# Patient Record
Sex: MALE | Race: WHITE | Employment: OTHER | URBAN - METROPOLITAN AREA
[De-identification: names, ages, dates, MRNs, and addresses within clinical notes are randomized per-mention and may not be internally consistent; named-entity substitution may affect disease eponyms.]

---

## 2017-07-07 PROBLEM — I45.10 RBBB: Status: ACTIVE | Noted: 2017-07-07

## 2018-04-24 ENCOUNTER — OFFICE VISIT (OUTPATIENT)
Dept: CARDIOLOGY CLINIC | Age: 68
End: 2018-04-24
Payer: MEDICARE

## 2018-04-24 VITALS
BODY MASS INDEX: 22.08 KG/M2 | HEIGHT: 77 IN | HEART RATE: 62 BPM | SYSTOLIC BLOOD PRESSURE: 102 MMHG | DIASTOLIC BLOOD PRESSURE: 62 MMHG | WEIGHT: 187 LBS

## 2018-04-24 DIAGNOSIS — I34.0 MILD MITRAL REGURGITATION: ICD-10-CM

## 2018-04-24 DIAGNOSIS — I36.1 NON-RHEUMATIC TRICUSPID VALVE INSUFFICIENCY: ICD-10-CM

## 2018-04-24 DIAGNOSIS — I10 ESSENTIAL HYPERTENSION: Primary | ICD-10-CM

## 2018-04-24 DIAGNOSIS — I45.10 RBBB: ICD-10-CM

## 2018-04-24 PROCEDURE — 99214 OFFICE O/P EST MOD 30 MIN: CPT | Performed by: INTERNAL MEDICINE

## 2018-04-24 PROCEDURE — 3017F COLORECTAL CA SCREEN DOC REV: CPT | Performed by: INTERNAL MEDICINE

## 2018-04-24 PROCEDURE — 1123F ACP DISCUSS/DSCN MKR DOCD: CPT | Performed by: INTERNAL MEDICINE

## 2018-04-24 PROCEDURE — G8427 DOCREV CUR MEDS BY ELIG CLIN: HCPCS | Performed by: INTERNAL MEDICINE

## 2018-04-24 PROCEDURE — 93000 ELECTROCARDIOGRAM COMPLETE: CPT | Performed by: INTERNAL MEDICINE

## 2018-04-24 PROCEDURE — 4040F PNEUMOC VAC/ADMIN/RCVD: CPT | Performed by: INTERNAL MEDICINE

## 2018-04-24 PROCEDURE — 1036F TOBACCO NON-USER: CPT | Performed by: INTERNAL MEDICINE

## 2018-04-24 PROCEDURE — G8420 CALC BMI NORM PARAMETERS: HCPCS | Performed by: INTERNAL MEDICINE

## 2019-03-07 ENCOUNTER — OFFICE VISIT (OUTPATIENT)
Dept: CARDIOLOGY CLINIC | Age: 69
End: 2019-03-07
Payer: MEDICARE

## 2019-03-07 VITALS
WEIGHT: 194 LBS | BODY MASS INDEX: 22.91 KG/M2 | SYSTOLIC BLOOD PRESSURE: 98 MMHG | DIASTOLIC BLOOD PRESSURE: 60 MMHG | HEART RATE: 68 BPM | HEIGHT: 77 IN

## 2019-03-07 DIAGNOSIS — R07.9 CHEST PAIN, UNSPECIFIED TYPE: Primary | ICD-10-CM

## 2019-03-07 PROCEDURE — G8420 CALC BMI NORM PARAMETERS: HCPCS | Performed by: NURSE PRACTITIONER

## 2019-03-07 PROCEDURE — 1123F ACP DISCUSS/DSCN MKR DOCD: CPT | Performed by: NURSE PRACTITIONER

## 2019-03-07 PROCEDURE — G8427 DOCREV CUR MEDS BY ELIG CLIN: HCPCS | Performed by: NURSE PRACTITIONER

## 2019-03-07 PROCEDURE — 1036F TOBACCO NON-USER: CPT | Performed by: NURSE PRACTITIONER

## 2019-03-07 PROCEDURE — 93000 ELECTROCARDIOGRAM COMPLETE: CPT | Performed by: NURSE PRACTITIONER

## 2019-03-07 PROCEDURE — 1101F PT FALLS ASSESS-DOCD LE1/YR: CPT | Performed by: NURSE PRACTITIONER

## 2019-03-07 PROCEDURE — 99214 OFFICE O/P EST MOD 30 MIN: CPT | Performed by: NURSE PRACTITIONER

## 2019-03-07 PROCEDURE — 3017F COLORECTAL CA SCREEN DOC REV: CPT | Performed by: NURSE PRACTITIONER

## 2019-03-07 PROCEDURE — G8484 FLU IMMUNIZE NO ADMIN: HCPCS | Performed by: NURSE PRACTITIONER

## 2019-03-07 PROCEDURE — 4040F PNEUMOC VAC/ADMIN/RCVD: CPT | Performed by: NURSE PRACTITIONER

## 2019-03-07 RX ORDER — AMLODIPINE BESYLATE 10 MG/1
5 TABLET ORAL DAILY
COMMUNITY
Start: 2019-02-21

## 2019-03-07 RX ORDER — ATORVASTATIN CALCIUM 20 MG/1
20 TABLET, FILM COATED ORAL DAILY
COMMUNITY
Start: 2019-02-21

## 2019-03-18 DIAGNOSIS — R07.9 CHEST PAIN, UNSPECIFIED TYPE: Primary | ICD-10-CM

## 2019-03-22 ENCOUNTER — HOSPITAL ENCOUNTER (OUTPATIENT)
Dept: CARDIOLOGY | Age: 69
Discharge: HOME | End: 2019-03-22
Payer: MEDICARE

## 2019-03-22 VITALS
DIASTOLIC BLOOD PRESSURE: 70 MMHG | WEIGHT: 195 LBS | HEIGHT: 77 IN | HEART RATE: 85 BPM | OXYGEN SATURATION: 98 % | SYSTOLIC BLOOD PRESSURE: 130 MMHG | BODY MASS INDEX: 23.02 KG/M2

## 2019-03-22 DIAGNOSIS — R07.9 CHEST PAIN, UNSPECIFIED TYPE: ICD-10-CM

## 2019-03-22 PROCEDURE — 78452 HT MUSCLE IMAGE SPECT MULT: CPT

## 2019-03-22 PROCEDURE — 93017 CV STRESS TEST TRACING ONLY: CPT

## 2019-03-22 PROCEDURE — A9502 TC99M TETROFOSMIN: HCPCS | Performed by: INTERNAL MEDICINE

## 2019-03-22 PROCEDURE — 93018 CV STRESS TEST I&R ONLY: CPT | Performed by: INTERNAL MEDICINE

## 2019-03-22 PROCEDURE — 3430000000 HC RX DIAGNOSTIC RADIOPHARMACEUTICAL: Performed by: INTERNAL MEDICINE

## 2019-03-22 PROCEDURE — 2580000003 HC RX 258: Performed by: INTERNAL MEDICINE

## 2019-03-22 PROCEDURE — 93016 CV STRESS TEST SUPVJ ONLY: CPT | Performed by: INTERNAL MEDICINE

## 2019-03-22 RX ORDER — SODIUM CHLORIDE 0.9 % (FLUSH) 0.9 %
10 SYRINGE (ML) INJECTION PRN
Status: DISCONTINUED | OUTPATIENT
Start: 2019-03-22 | End: 2019-03-23 | Stop reason: HOSPADM

## 2019-03-22 RX ADMIN — Medication 10 ML: at 10:30

## 2019-03-22 RX ADMIN — TETROFOSMIN 10 MILLICURIE: 0.23 INJECTION, POWDER, LYOPHILIZED, FOR SOLUTION INTRAVENOUS at 08:38

## 2019-03-22 RX ADMIN — Medication 10 ML: at 08:38

## 2019-03-22 RX ADMIN — TETROFOSMIN 33 MILLICURIE: 0.23 INJECTION, POWDER, LYOPHILIZED, FOR SOLUTION INTRAVENOUS at 10:30

## 2019-03-25 ENCOUNTER — TELEPHONE (OUTPATIENT)
Dept: CARDIOLOGY CLINIC | Age: 69
End: 2019-03-25

## 2019-07-09 ENCOUNTER — OFFICE VISIT (OUTPATIENT)
Dept: ONCOLOGY | Age: 69
End: 2019-07-09
Payer: MEDICARE

## 2019-07-09 ENCOUNTER — HOSPITAL ENCOUNTER (OUTPATIENT)
Dept: INFUSION THERAPY | Age: 69
Discharge: HOME | End: 2019-07-09
Payer: MEDICARE

## 2019-07-09 VITALS
HEIGHT: 77 IN | HEART RATE: 65 BPM | BODY MASS INDEX: 22.32 KG/M2 | WEIGHT: 189 LBS | DIASTOLIC BLOOD PRESSURE: 74 MMHG | TEMPERATURE: 98.7 F | SYSTOLIC BLOOD PRESSURE: 126 MMHG

## 2019-07-09 DIAGNOSIS — D47.2 MONOCLONAL GAMMOPATHY: Primary | ICD-10-CM

## 2019-07-09 DIAGNOSIS — D47.2 MONOCLONAL GAMMOPATHY: ICD-10-CM

## 2019-07-09 LAB
ALBUMIN SERPL-MCNC: 4.4 G/DL (ref 3.5–5.2)
ALP BLD-CCNC: 60 U/L (ref 40–129)
ALT SERPL-CCNC: 12 U/L (ref 0–40)
ANION GAP SERPL CALCULATED.3IONS-SCNC: 12 MMOL/L (ref 7–16)
AST SERPL-CCNC: 28 U/L (ref 0–39)
BASOPHILS ABSOLUTE: 0.04 E9/L (ref 0–0.2)
BASOPHILS RELATIVE PERCENT: 0.4 % (ref 0–2)
BILIRUB SERPL-MCNC: 0.5 MG/DL (ref 0–1.2)
BUN BLDV-MCNC: 17 MG/DL (ref 8–23)
CALCIUM SERPL-MCNC: 9.3 MG/DL (ref 8.6–10.2)
CHLORIDE BLD-SCNC: 106 MMOL/L (ref 98–107)
CO2: 25 MMOL/L (ref 22–29)
CREAT SERPL-MCNC: 0.8 MG/DL (ref 0.7–1.2)
EOSINOPHILS ABSOLUTE: 0.11 E9/L (ref 0.05–0.5)
EOSINOPHILS RELATIVE PERCENT: 1.2 % (ref 0–6)
GFR AFRICAN AMERICAN: >60
GFR NON-AFRICAN AMERICAN: >60 ML/MIN/1.73
GLUCOSE BLD-MCNC: 100 MG/DL (ref 74–99)
HCT VFR BLD CALC: 41.7 % (ref 37–54)
HEMOGLOBIN: 14.4 G/DL (ref 12.5–16.5)
IMMATURE GRANULOCYTES #: 0.02 E9/L
IMMATURE GRANULOCYTES %: 0.2 % (ref 0–5)
LACTATE DEHYDROGENASE: 159 U/L (ref 135–225)
LYMPHOCYTES ABSOLUTE: 2.34 E9/L (ref 1.5–4)
LYMPHOCYTES RELATIVE PERCENT: 24.6 % (ref 20–42)
MCH RBC QN AUTO: 31.2 PG (ref 26–35)
MCHC RBC AUTO-ENTMCNC: 34.5 % (ref 32–34.5)
MCV RBC AUTO: 90.3 FL (ref 80–99.9)
MONOCYTES ABSOLUTE: 0.85 E9/L (ref 0.1–0.95)
MONOCYTES RELATIVE PERCENT: 8.9 % (ref 2–12)
NEUTROPHILS ABSOLUTE: 6.14 E9/L (ref 1.8–7.3)
NEUTROPHILS RELATIVE PERCENT: 64.7 % (ref 43–80)
PDW BLD-RTO: 12.4 FL (ref 11.5–15)
PLATELET # BLD: 205 E9/L (ref 130–450)
PMV BLD AUTO: 9 FL (ref 7–12)
POTASSIUM SERPL-SCNC: 4.1 MMOL/L (ref 3.5–5)
RBC # BLD: 4.62 E12/L (ref 3.8–5.8)
SODIUM BLD-SCNC: 143 MMOL/L (ref 132–146)
WBC # BLD: 9.5 E9/L (ref 4.5–11.5)

## 2019-07-09 PROCEDURE — 84165 PROTEIN E-PHORESIS SERUM: CPT

## 2019-07-09 PROCEDURE — 80053 COMPREHEN METABOLIC PANEL: CPT

## 2019-07-09 PROCEDURE — 82232 ASSAY OF BETA-2 PROTEIN: CPT

## 2019-07-09 PROCEDURE — 83615 LACTATE (LD) (LDH) ENZYME: CPT

## 2019-07-09 PROCEDURE — 86334 IMMUNOFIX E-PHORESIS SERUM: CPT

## 2019-07-09 PROCEDURE — 83883 ASSAY NEPHELOMETRY NOT SPEC: CPT

## 2019-07-09 PROCEDURE — 99214 OFFICE O/P EST MOD 30 MIN: CPT

## 2019-07-09 PROCEDURE — 82784 ASSAY IGA/IGD/IGG/IGM EACH: CPT

## 2019-07-09 PROCEDURE — 85025 COMPLETE CBC W/AUTO DIFF WBC: CPT

## 2019-07-09 PROCEDURE — 36415 COLL VENOUS BLD VENIPUNCTURE: CPT

## 2019-07-09 RX ORDER — GLUCOSAMINE HCL 500 MG
TABLET ORAL
COMMUNITY

## 2019-07-09 NOTE — PROGRESS NOTES
Lynette Werner  1950 68 y.o. Referring Physician: Dr. Maral Martinez    PCP: Satnam Montiel DO    There were no vitals filed for this visit. Wt Readings from Last 3 Encounters:   19 189 lb (85.7 kg)   19 195 lb (88.5 kg)   19 194 lb (88 kg)        There is no height or weight on file to calculate BMI. Chief Complaint: \"cancer indicator in a blood test came back abnormal\"       Cancer Staging  No matching staging information was found for the patient. Prior Radiation Therapy? NO    Concurrent Chemo/radiation? NO    Prior Chemotherapy? NO    Prior Hormonal Therapy? NO    Head and Neck Cancer? No, patient does NOT have HN cancer. LMP:NA    Age at first Menses: NA    : NA    Para: NA          Current Outpatient Medications:     Cholecalciferol (VITAMIN D3) 3000 units TABS, Take by mouth, Disp: , Rfl:     atorvastatin (LIPITOR) 20 MG tablet, Take 20 mg by mouth daily , Disp: , Rfl:     amLODIPine (NORVASC) 10 MG tablet, Take 5 mg by mouth daily , Disp: , Rfl:     Benfotiamine 150 MG CAPS, Take by mouth, Disp: , Rfl:     doxazosin (CARDURA) 4 MG tablet, Take 4 mg by mouth nightly, Disp: , Rfl:     aspirin 81 MG tablet, Take 81 mg by mouth daily. , Disp: , Rfl:     lisinopril (PRINIVIL;ZESTRIL) 20 MG tablet, Take 20 mg by mouth daily. , Disp: , Rfl:        Past Medical History:   Diagnosis Date    Hypertension     x25 years    Mild mitral regurgitation     RBBB 2017       Past Surgical History:   Procedure Laterality Date    APPENDECTOMY         Family History   Problem Relation Age of Onset    Heart Attack Mother 71        heart attack    Stroke Father 71        heart attack       Social History     Socioeconomic History    Marital status: Single     Spouse name: Not on file    Number of children: Not on file    Years of education: Not on file    Highest education level: Not on file   Occupational History    Not on file   Social Needs    Financial resource strain: Not on file    Food insecurity:     Worry: Not on file     Inability: Not on file    Transportation needs:     Medical: Not on file     Non-medical: Not on file   Tobacco Use    Smoking status: Former Smoker    Smokeless tobacco: Never Used   Substance and Sexual Activity    Alcohol use: Yes     Comment: social.     Drug use: No    Sexual activity: Not on file   Lifestyle    Physical activity:     Days per week: Not on file     Minutes per session: Not on file    Stress: Not on file   Relationships    Social connections:     Talks on phone: Not on file     Gets together: Not on file     Attends Alevism service: Not on file     Active member of club or organization: Not on file     Attends meetings of clubs or organizations: Not on file     Relationship status: Not on file    Intimate partner violence:     Fear of current or ex partner: Not on file     Emotionally abused: Not on file     Physically abused: Not on file     Forced sexual activity: Not on file   Other Topics Concern    Not on file   Social History Narrative    Not on file           Occupation: retired  Retired:  YES: Patient is retired from [employer] in [address] [place] [address] . REVIEW OF SYSTEMS:   Pacemaker/Defibulator/ICD:  No    Mediport: No           FALLS RISK SCREENING ASSESSMENT    Instructions:  Assess the patient and [employer] the appropriate indicators that are present for fall risk identification. Total the numbers circled and assign a fall risk score from Table 2.  Reassess patient at a minimum every 12 weeks or with status change. Assessment   Date  7/9/2019     1. Mental Ability: confusion/cognitively impaired no       2. Elimination Issues: incontinence, frequency No - 0       3. Ambulatory: use of assistive devices (walker, cane, off-loading devices), attached to equipment (IV pole, oxygen) No - 0     4. Sensory Limitations: dizziness, vertigo, impaired vision no       5.   Age 72 years or greater - 1       6.  Medication: diuretics, strong analgesics, hypnotics, sedatives, antihypertensive agents   Yes - 3   7. Falls:  recent history of falls within the last 3 months (not to include slipping or tripping)   No - 0   TOTAL 4    If score of 4 or greater was education given? Yes       TABLE 2   Risk Score Risk Level Plan of Care   0-3 Little or  No Risk 1. Provide assistance as indicated for ambulation activities  2. Reorient confused/cognitively impaired patient  3. Call-light/bell within patient's reach  4. Chair/bed in low position, stretcher/bed with siderails up except when performing patient care activities  5. Educate patient/family/caregiver on falls prevention  6.  Reassess in 12 weeks or with any noted change in patient condition which places them at a risk for a fall   4-6 Moderate Risk 1. Provide assistance as indicated for ambulation activities  2. Reorient confused/cognitively impaired patient  3. Call-light/bell within patient's reach  4. Chair/bed in low position, stretcher/bed with siderails up except when performing patient care activities  5. Educate patient/family/caregiver on falls prevention  6. Falls risk precaution (Yellow sticker Level II) placed on patient chart   7 or   Higher High Risk 1. Place patient in easily observable treatment room  2. Patient attended at all times by family member or staff  3. Provide assistance as indicated for ambulation activities  4. Reorient confused/cognitively impaired patient  5. Call-light/bell within patient's reach  6. Chair/bed in low position, stretcher/bed with siderails up except when performing patient care activities  7. Educate patient/family/caregiver on falls prevention  8. Falls risk precaution (Yellow sticker Level III) placed on patient chart           MALNUTRITION RISK SCREENING ASSESSMENT    Instructions:  Assess the patient and enter the appropriate indicators that are present for nutrition risk identification.  Total the numbers entered and assign a risk score. Follow the appropriate action for total score listed below. Assessment   Date  7/9/2019     1. Have you lost weight without trying? 0- No     2. Have you been eating poorly because of a decreased appetite? 0- No   3. Do you have a diagnosis of head and neck cancer?       0- No                                                                                    TOTAL 0        Score of 0-1: No action  Score 2 or greater:  · For Non-Diabetic Patient: Recommend adding Ensure Complete 2 x daily and provide patient with Ensure wellness bag with coupons  · For Diabetic Patient: Recommend adding Glucerna Shake 2 x daily and provide patient with Glucerna Wellness bag with coupons  · Route to the dietitian via [employer]

## 2019-07-09 NOTE — PROGRESS NOTES
Start Date End Date Taking? Authorizing Provider   Cholecalciferol (VITAMIN D3) 3000 units TABS Take by mouth   Yes Historical Provider, MD   atorvastatin (LIPITOR) 20 MG tablet Take 20 mg by mouth daily  2/21/19  Yes Historical Provider, MD   amLODIPine (NORVASC) 10 MG tablet Take 5 mg by mouth daily  2/21/19  Yes Historical Provider, MD   Benfotiamine 150 MG CAPS Take by mouth   Yes Historical Provider, MD   doxazosin (CARDURA) 4 MG tablet Take 4 mg by mouth nightly   Yes Historical Provider, MD   lisinopril (PRINIVIL;ZESTRIL) 20 MG tablet Take 20 mg by mouth daily. Yes Historical Provider, MD   aspirin 81 MG tablet Take 81 mg by mouth daily. Historical Provider, MD       Allergies  No Known Allergies    Review of Systems:    Constitutional:  No fever chills or rigors. Eyes: No changes in vision, discharge, or pain  ENT: No Headaches, hearing loss or vertigo. No mouth sores or sore throat. No change in taste or smell. Cardiovascular: No chest discomfort, dyspnea on exertion, palpitations or loss of consciousness. or phlebitis. Respiratory: Has no cough or wheezing, Has no sputum production. Has no hemoptysis, Has no pleuritic pain, . Gastrointestinal: No abdominal pain, appetite loss, blood in stools. No change in bowel habits. No hematemesis   Genitourinary: Patient acknowledges no dysuria, trouble voiding, or hematuria. No nocturia or increased frequency. Musculoskeletal: No gait disturbance, weakness or joint complaints. Integumentary: No rash or pruritis. Neurological: No headache, diplopia, change in muscle strength. + numbness or tingling ONLY w/ riding his snowmobile. No change in gait, balance, coordination, mood, affect, memory, mentation, behavior. Psychiatric: No anxiety, or depression. Endocrine: No temperature intolerance. No excessive thirst, fluid intake, or urination. No tremor. Hematologic/Lymphatic: No abnormal bruising or bleeding, blood clots or swollen lymph nodes. all orders for this visit:    Monoclonal gammopathy    67 yo male  Abnormal M-spike of 0.3  HTN    - Will pursue work up to r/o MM  - Repeat SPEP, add BENI and UPEP  - Repeat CBC/CMP  - Ig's  - K/L FLC and FLCR  - B-2 microglobulin  - LDH  - Depending on above results, may need a BMBx.  However there is a decent possibility this represents a MGUS  - RTC in 2 weeks to review results        Matheus Loyola MD   Electronically signed 7/9/2019 at 2:33 PM

## 2019-07-12 LAB
BETA-2 MICROGLOBULIN: 1.3 MG/L (ref 0.6–2.4)
KAPPA FREE LIGHT CHAINS QNT: 2.18 MG/DL (ref 0.33–1.94)
KAPPA/LAMBDA FREE LIGHT CHAIN RATIO: 1.69 (ref 0.26–1.65)
LAMBDA FREE LIGHT CHAINS QNT: 1.29 MG/DL (ref 0.57–2.63)

## 2019-07-17 LAB
ALBUMIN SERPL-MCNC: 3.8 G/DL (ref 3.5–4.7)
ALPHA-1-GLOBULIN: 0.3 G/DL (ref 0.2–0.4)
ALPHA-2-GLOBULIN: 0.8 G/FL (ref 0.5–1)
BETA GLOBULIN: 1.1 G/DL (ref 0.8–1.3)
ELECTROPHORESIS: NORMAL
GAMMA GLOBULIN: 1.2 G/DL (ref 0.7–1.6)
IGA: 391 MG/DL (ref 70–400)
IGG: 911 MG/DL (ref 700–1600)
IGM: 75 MG/DL (ref 40–230)
IMMUNOFIXATION RESULT, SERUM: NORMAL
TOTAL PROTEIN: 7.2 G/DL (ref 6.4–8.3)

## 2019-07-23 ENCOUNTER — HOSPITAL ENCOUNTER (OUTPATIENT)
Dept: INFUSION THERAPY | Age: 69
Discharge: HOME | End: 2019-07-23
Payer: MEDICARE

## 2019-07-23 ENCOUNTER — OFFICE VISIT (OUTPATIENT)
Dept: ONCOLOGY | Age: 69
End: 2019-07-23
Payer: MEDICARE

## 2019-07-23 VITALS
SYSTOLIC BLOOD PRESSURE: 117 MMHG | WEIGHT: 186 LBS | HEART RATE: 58 BPM | BODY MASS INDEX: 21.96 KG/M2 | DIASTOLIC BLOOD PRESSURE: 69 MMHG | TEMPERATURE: 98.1 F | OXYGEN SATURATION: 97 % | HEIGHT: 77 IN

## 2019-07-23 DIAGNOSIS — D47.2 MONOCLONAL GAMMOPATHY: Primary | ICD-10-CM

## 2019-07-23 PROCEDURE — 99212 OFFICE O/P EST SF 10 MIN: CPT

## 2019-07-23 NOTE — PROGRESS NOTES
Medications  Prior to Admission medications    Medication Sig Start Date End Date Taking? Authorizing Provider   Cholecalciferol (VITAMIN D3) 3000 units TABS Take by mouth   Yes Historical Provider, MD   atorvastatin (LIPITOR) 20 MG tablet Take 20 mg by mouth daily  2/21/19  Yes Historical Provider, MD   amLODIPine (NORVASC) 10 MG tablet Take 5 mg by mouth daily  2/21/19  Yes Historical Provider, MD   Benfotiamine 150 MG CAPS Take by mouth   Yes Historical Provider, MD   doxazosin (CARDURA) 4 MG tablet Take 4 mg by mouth nightly   Yes Historical Provider, MD   aspirin 81 MG tablet Take 81 mg by mouth daily. Yes Historical Provider, MD   lisinopril (PRINIVIL;ZESTRIL) 20 MG tablet Take 20 mg by mouth daily. Yes Historical Provider, MD       Allergies  No Known Allergies    Review of Systems:    Constitutional:  No fever chills or rigors. Eyes: No changes in vision, discharge, or pain  ENT: No Headaches, hearing loss or vertigo. No mouth sores or sore throat. No change in taste or smell. Cardiovascular: No chest discomfort, dyspnea on exertion, palpitations or loss of consciousness. or phlebitis. Respiratory: Has no cough or wheezing, Has no sputum production. Has no hemoptysis, Has no pleuritic pain, . Gastrointestinal: No abdominal pain, appetite loss, blood in stools. No change in bowel habits. No hematemesis   Genitourinary: Patient acknowledges no dysuria, trouble voiding, or hematuria. No nocturia or increased frequency. Musculoskeletal: No gait disturbance, weakness or joint complaints. Integumentary: No rash or pruritis. Neurological: No headache, diplopia, change in muscle strength. + numbness or tingling ONLY w/ riding his snowmobile. No change in gait, balance, coordination, mood, affect, memory, mentation, behavior. Psychiatric: No anxiety, or depression. Endocrine: No temperature intolerance. No excessive thirst, fluid intake, or urination. No tremor.    Hematologic/Lymphatic: No abnormal

## 2019-10-08 ENCOUNTER — OFFICE VISIT (OUTPATIENT)
Dept: CARDIOLOGY CLINIC | Age: 69
End: 2019-10-08
Payer: MEDICARE

## 2019-10-08 VITALS
BODY MASS INDEX: 22.43 KG/M2 | DIASTOLIC BLOOD PRESSURE: 70 MMHG | WEIGHT: 190 LBS | HEIGHT: 77 IN | SYSTOLIC BLOOD PRESSURE: 120 MMHG | HEART RATE: 65 BPM

## 2019-10-08 DIAGNOSIS — I45.10 RBBB (RIGHT BUNDLE BRANCH BLOCK): ICD-10-CM

## 2019-10-08 DIAGNOSIS — I34.0 MILD MITRAL REGURGITATION: Primary | ICD-10-CM

## 2019-10-08 DIAGNOSIS — I10 ESSENTIAL HYPERTENSION: ICD-10-CM

## 2019-10-08 PROCEDURE — 3017F COLORECTAL CA SCREEN DOC REV: CPT | Performed by: INTERNAL MEDICINE

## 2019-10-08 PROCEDURE — G8427 DOCREV CUR MEDS BY ELIG CLIN: HCPCS | Performed by: INTERNAL MEDICINE

## 2019-10-08 PROCEDURE — G8484 FLU IMMUNIZE NO ADMIN: HCPCS | Performed by: INTERNAL MEDICINE

## 2019-10-08 PROCEDURE — 1123F ACP DISCUSS/DSCN MKR DOCD: CPT | Performed by: INTERNAL MEDICINE

## 2019-10-08 PROCEDURE — 99213 OFFICE O/P EST LOW 20 MIN: CPT | Performed by: INTERNAL MEDICINE

## 2019-10-08 PROCEDURE — G8420 CALC BMI NORM PARAMETERS: HCPCS | Performed by: INTERNAL MEDICINE

## 2019-10-08 PROCEDURE — 4040F PNEUMOC VAC/ADMIN/RCVD: CPT | Performed by: INTERNAL MEDICINE

## 2019-10-08 PROCEDURE — 93000 ELECTROCARDIOGRAM COMPLETE: CPT | Performed by: INTERNAL MEDICINE

## 2019-10-08 PROCEDURE — 1036F TOBACCO NON-USER: CPT | Performed by: INTERNAL MEDICINE

## 2019-10-22 ENCOUNTER — HOSPITAL ENCOUNTER (OUTPATIENT)
Dept: INFUSION THERAPY | Age: 69
Discharge: HOME | End: 2019-10-22
Payer: MEDICARE

## 2019-10-22 ENCOUNTER — OFFICE VISIT (OUTPATIENT)
Dept: ONCOLOGY | Age: 69
End: 2019-10-22
Payer: MEDICARE

## 2019-10-22 VITALS
DIASTOLIC BLOOD PRESSURE: 71 MMHG | OXYGEN SATURATION: 97 % | HEART RATE: 64 BPM | SYSTOLIC BLOOD PRESSURE: 132 MMHG | BODY MASS INDEX: 22.35 KG/M2 | WEIGHT: 189.3 LBS | HEIGHT: 77 IN | TEMPERATURE: 97 F

## 2019-10-22 DIAGNOSIS — D47.2 MONOCLONAL GAMMOPATHY: ICD-10-CM

## 2019-10-22 DIAGNOSIS — D47.2 MONOCLONAL GAMMOPATHY: Primary | ICD-10-CM

## 2019-10-22 LAB
BASOPHILS ABSOLUTE: 0.03 E9/L (ref 0–0.2)
BASOPHILS RELATIVE PERCENT: 0.4 % (ref 0–2)
EOSINOPHILS ABSOLUTE: 0.14 E9/L (ref 0.05–0.5)
EOSINOPHILS RELATIVE PERCENT: 1.8 % (ref 0–6)
HCT VFR BLD CALC: 43.1 % (ref 37–54)
HEMOGLOBIN: 14.4 G/DL (ref 12.5–16.5)
IMMATURE GRANULOCYTES #: 0.02 E9/L
IMMATURE GRANULOCYTES %: 0.3 % (ref 0–5)
LYMPHOCYTES ABSOLUTE: 2.09 E9/L (ref 1.5–4)
LYMPHOCYTES RELATIVE PERCENT: 27 % (ref 20–42)
MCH RBC QN AUTO: 31 PG (ref 26–35)
MCHC RBC AUTO-ENTMCNC: 33.4 % (ref 32–34.5)
MCV RBC AUTO: 92.9 FL (ref 80–99.9)
MONOCYTES ABSOLUTE: 0.91 E9/L (ref 0.1–0.95)
MONOCYTES RELATIVE PERCENT: 11.8 % (ref 2–12)
NEUTROPHILS ABSOLUTE: 4.54 E9/L (ref 1.8–7.3)
NEUTROPHILS RELATIVE PERCENT: 58.7 % (ref 43–80)
PDW BLD-RTO: 12.1 FL (ref 11.5–15)
PLATELET # BLD: 233 E9/L (ref 130–450)
PMV BLD AUTO: 9.2 FL (ref 7–12)
RBC # BLD: 4.64 E12/L (ref 3.8–5.8)
WBC # BLD: 7.7 E9/L (ref 4.5–11.5)

## 2019-10-22 PROCEDURE — 83883 ASSAY NEPHELOMETRY NOT SPEC: CPT

## 2019-10-22 PROCEDURE — 99212 OFFICE O/P EST SF 10 MIN: CPT

## 2019-10-22 PROCEDURE — 84165 PROTEIN E-PHORESIS SERUM: CPT

## 2019-10-22 PROCEDURE — 82232 ASSAY OF BETA-2 PROTEIN: CPT

## 2019-10-22 PROCEDURE — 86334 IMMUNOFIX E-PHORESIS SERUM: CPT

## 2019-10-22 PROCEDURE — 36415 COLL VENOUS BLD VENIPUNCTURE: CPT

## 2019-10-22 PROCEDURE — 82784 ASSAY IGA/IGD/IGG/IGM EACH: CPT

## 2019-10-22 PROCEDURE — 85025 COMPLETE CBC W/AUTO DIFF WBC: CPT

## 2019-10-23 LAB
ALBUMIN SERPL-MCNC: 3.7 G/DL (ref 3.5–4.7)
ALPHA-1-GLOBULIN: 0.2 G/DL (ref 0.2–0.4)
ALPHA-2-GLOBULIN: 0.7 G/FL (ref 0.5–1)
BETA GLOBULIN: 1.1 G/DL (ref 0.8–1.3)
ELECTROPHORESIS: NORMAL
GAMMA GLOBULIN: 1.3 G/DL (ref 0.7–1.6)
IGA: 407 MG/DL (ref 70–400)
IGG: 904 MG/DL (ref 700–1600)
IGM: 84 MG/DL (ref 40–230)
IMMUNOFIXATION RESULT, SERUM: NORMAL
TOTAL PROTEIN: 7 G/DL (ref 6.4–8.3)

## 2019-10-24 LAB
BETA-2 MICROGLOBULIN: 1.6 MG/L (ref 0.6–2.4)
KAPPA FREE LIGHT CHAINS QNT: 2.36 MG/DL (ref 0.33–1.94)
KAPPA/LAMBDA FREE LIGHT CHAIN RATIO: 1.62 (ref 0.26–1.65)
LAMBDA FREE LIGHT CHAINS QNT: 1.46 MG/DL (ref 0.57–2.63)

## 2020-02-25 ENCOUNTER — HOSPITAL ENCOUNTER (OUTPATIENT)
Dept: INFUSION THERAPY | Age: 70
Discharge: HOME | End: 2020-02-25
Payer: MEDICARE

## 2020-02-25 ENCOUNTER — OFFICE VISIT (OUTPATIENT)
Dept: ONCOLOGY | Age: 70
End: 2020-02-25
Payer: MEDICARE

## 2020-02-25 VITALS
SYSTOLIC BLOOD PRESSURE: 99 MMHG | TEMPERATURE: 96.5 F | DIASTOLIC BLOOD PRESSURE: 63 MMHG | OXYGEN SATURATION: 97 % | WEIGHT: 188.2 LBS | HEIGHT: 77 IN | HEART RATE: 66 BPM | BODY MASS INDEX: 22.22 KG/M2

## 2020-02-25 DIAGNOSIS — D47.2 MONOCLONAL GAMMOPATHY: ICD-10-CM

## 2020-02-25 LAB
ALBUMIN SERPL-MCNC: 4.3 G/DL (ref 3.5–5.2)
ALP BLD-CCNC: 54 U/L (ref 40–129)
ALT SERPL-CCNC: 10 U/L (ref 0–40)
ANION GAP SERPL CALCULATED.3IONS-SCNC: 12 MMOL/L (ref 7–16)
AST SERPL-CCNC: 23 U/L (ref 0–39)
BASOPHILS ABSOLUTE: 0.05 E9/L (ref 0–0.2)
BASOPHILS RELATIVE PERCENT: 0.8 % (ref 0–2)
BILIRUB SERPL-MCNC: 0.5 MG/DL (ref 0–1.2)
BUN BLDV-MCNC: 21 MG/DL (ref 8–23)
CALCIUM SERPL-MCNC: 9.6 MG/DL (ref 8.6–10.2)
CHLORIDE BLD-SCNC: 102 MMOL/L (ref 98–107)
CO2: 25 MMOL/L (ref 22–29)
CREAT SERPL-MCNC: 0.7 MG/DL (ref 0.7–1.2)
EOSINOPHILS ABSOLUTE: 0.11 E9/L (ref 0.05–0.5)
EOSINOPHILS RELATIVE PERCENT: 1.8 % (ref 0–6)
GFR AFRICAN AMERICAN: >60
GFR NON-AFRICAN AMERICAN: >60 ML/MIN/1.73
GLUCOSE BLD-MCNC: 105 MG/DL (ref 74–99)
HCT VFR BLD CALC: 44 % (ref 37–54)
HEMOGLOBIN: 15 G/DL (ref 12.5–16.5)
IMMATURE GRANULOCYTES #: 0.02 E9/L
IMMATURE GRANULOCYTES %: 0.3 % (ref 0–5)
LYMPHOCYTES ABSOLUTE: 1.86 E9/L (ref 1.5–4)
LYMPHOCYTES RELATIVE PERCENT: 30.5 % (ref 20–42)
MCH RBC QN AUTO: 31 PG (ref 26–35)
MCHC RBC AUTO-ENTMCNC: 34.1 % (ref 32–34.5)
MCV RBC AUTO: 90.9 FL (ref 80–99.9)
MONOCYTES ABSOLUTE: 0.66 E9/L (ref 0.1–0.95)
MONOCYTES RELATIVE PERCENT: 10.8 % (ref 2–12)
NEUTROPHILS ABSOLUTE: 3.39 E9/L (ref 1.8–7.3)
NEUTROPHILS RELATIVE PERCENT: 55.8 % (ref 43–80)
PDW BLD-RTO: 12 FL (ref 11.5–15)
PLATELET # BLD: 235 E9/L (ref 130–450)
PMV BLD AUTO: 9.7 FL (ref 7–12)
POTASSIUM SERPL-SCNC: 4.1 MMOL/L (ref 3.5–5)
RBC # BLD: 4.84 E12/L (ref 3.8–5.8)
SODIUM BLD-SCNC: 139 MMOL/L (ref 132–146)
WBC # BLD: 6.1 E9/L (ref 4.5–11.5)

## 2020-02-25 PROCEDURE — 99212 OFFICE O/P EST SF 10 MIN: CPT

## 2020-02-25 PROCEDURE — 80053 COMPREHEN METABOLIC PANEL: CPT

## 2020-02-25 PROCEDURE — 84165 PROTEIN E-PHORESIS SERUM: CPT

## 2020-02-25 PROCEDURE — 83883 ASSAY NEPHELOMETRY NOT SPEC: CPT

## 2020-02-25 PROCEDURE — 36415 COLL VENOUS BLD VENIPUNCTURE: CPT

## 2020-02-25 PROCEDURE — 82784 ASSAY IGA/IGD/IGG/IGM EACH: CPT

## 2020-02-25 PROCEDURE — 85025 COMPLETE CBC W/AUTO DIFF WBC: CPT

## 2020-02-25 NOTE — PROGRESS NOTES
[address]  [place] , [facility]   Hematology/Oncology  Consult      Patient Name: Farhad Balbuena  YOB: 1950  PCP: Devan Flores DO   Referring Provider: [address] [place] [address] / [facility] [id number]     Reason for Consultation:   Chief Complaint   Patient presents with    Follow-up     Monoclonal gammopathy        Subjective:  No new issues, no new complaints, BL katelynn numbness stable. Going for surgery with NSG at [facility] in April. Continues to feel very well    History of Present Illness: This pt is a very pleasant 69 yo male who presents today in referral for evaluation of an abnormal SPEP which showed a M-spike of 0.3 done by his PCP. Review of his CBC's over the last 10 years show a completely normal cell count. He states that this work up began after he noticed numbness, tingling and difficulty using his hands after he went snowmobiling some time ago. He states he gets this sensation every time he snowmobiles, but at no other time including when he is out in the cold or skiing, and not when he is holding other vibrating tools such as his lawnmower or weeding machine. He otherwise has no complaints today including no bone or back pain, no fevers, chills, sweats, or abnormal weight loss. He has not had any HA, dizziness, lightheadedness or other neuropathy.     Diagnostic Data:     Past Medical History:   Diagnosis Date    Hypertension     x25 years    Mild mitral regurgitation     RBBB 7/7/2017       Patient Active Problem List    Diagnosis Date Noted    RBBB 07/07/2017    Essential hypertension, benign 05/19/2015    Tricuspid regurgitation 05/19/2015    Mild mitral regurgitation         Past Surgical History:   Procedure Laterality Date    APPENDECTOMY         Family History  Family History   Problem Relation Age of Onset    Heart Attack Mother 71        heart attack    Stroke Father 71        heart attack       Social History    TOBACCO:   reports that he has quit smoking. He has never used smokeless tobacco.  ETOH:   reports current alcohol use. Home Medications  Prior to Admission medications    Medication Sig Start Date End Date Taking? Authorizing Provider   Cholecalciferol (VITAMIN D3) 3000 units TABS Take by mouth    Historical Provider, MD   atorvastatin (LIPITOR) 20 MG tablet Take 20 mg by mouth daily  2/21/19   Historical Provider, MD   amLODIPine (NORVASC) 10 MG tablet Take 5 mg by mouth daily  2/21/19   Historical Provider, MD   Benfotiamine 150 MG CAPS Take by mouth    Historical Provider, MD   doxazosin (CARDURA) 4 MG tablet Take 4 mg by mouth nightly    Historical Provider, MD   lisinopril (PRINIVIL;ZESTRIL) 20 MG tablet Take 20 mg by mouth daily. Historical Provider, MD       Allergies  No Known Allergies    Review of Systems:    Constitutional:  No fever chills or rigors. Eyes: No changes in vision, discharge, or pain  ENT: No Headaches, hearing loss or vertigo. No mouth sores or sore throat. No change in taste or smell. Cardiovascular: No chest discomfort, dyspnea on exertion, palpitations or loss of consciousness. or phlebitis. Respiratory: Has no cough or wheezing, Has no sputum production. Has no hemoptysis, Has no pleuritic pain, . Gastrointestinal: No abdominal pain, appetite loss, blood in stools. No change in bowel habits. No hematemesis   Genitourinary: Patient acknowledges no dysuria, trouble voiding, or hematuria. No nocturia or increased frequency. Musculoskeletal: No gait disturbance, weakness or joint complaints. Integumentary: No rash or pruritis. Neurological: No headache, diplopia, change in muscle strength. + numbness or tingling ONLY w/ riding his snowmobile. No change in gait, balance, coordination, mood, affect, memory, mentation, behavior. Psychiatric: No anxiety, or depression. Endocrine: No temperature intolerance. No excessive thirst, fluid intake, or urination. No tremor.    Hematologic/Lymphatic: No abnormal bruising found.      ASSESSMENT/PLAN :    Mary Guerrier was seen today for follow-up. Diagnoses and all orders for this visit:    Monoclonal gammopathy    69 yo male  Abnormal M-spike of 0.3  HTN    - Will pursue work up to r/o MM  - Repeat SPEP, add BENI and UPEP  - Repeat CBC/CMP  - Ig's  - K/L FLC and FLCR  - B-2 microglobulin  - LDH  - Depending on above results, may need a BMBx. However there is a decent possibility this represents a MGUS  - RTC in 2 weeks to review results    7/23/19  - Ig's normal  - Beta-2 1.3  - SPEP w/ faint restriction in gamma region and normal polyclonal IG's. Spoke with pathology and this was an unusual result, possibly an IgD or IgE. K/L FLC of 2.18/1.29, with FLCR of 1.69 (very slightly elevated)  - CBC and CMP WNL  - LDH normal  - Above abnormalities are slight, and given his otherwise lack of symptoms (except when on snowmobile), will elect to proceed with observation at this time  - RTC in 3 months w/ SPEP w/ BENI, Ig's, UPEP and BENI, and K/L FLC    10/22/19  - Labs today pending  - Saw neurology and diagnosed with a cervical radiculopathy (via EMG) which is believed to be causing him numbness. He will send me the reports as he forgot to bring them in today  - RTC in 4 months with repeat labs    2/25/20  - Labs from 10/19 stable  - Cervical radiculopathy causing BL hand numbness with snowmobile activities.  Going for surgery with NSG at [facility] in 4/20   - Cont to monitor MM panel, will move to q 6 months  - RTC in 6 months      Yenny Abdullahi MD   Electronically signed 2/25/2020 at 10:47 AM

## 2020-02-26 LAB
ALBUMIN SERPL-MCNC: 3.8 G/DL (ref 3.5–4.7)
ALPHA-1-GLOBULIN: 0.3 G/DL (ref 0.2–0.4)
ALPHA-2-GLOBULIN: 0.8 G/FL (ref 0.5–1)
BETA GLOBULIN: 0.9 G/DL (ref 0.8–1.3)
ELECTROPHORESIS: NORMAL
GAMMA GLOBULIN: 1.2 G/DL (ref 0.7–1.6)
IGA: 394 MG/DL (ref 70–400)
IGG: 889 MG/DL (ref 700–1600)
IGM: 77 MG/DL (ref 40–230)
KAPPA FREE LIGHT CHAINS QNT: 20.38 MG/L (ref 3.3–19.4)
KAPPA/LAMBDA FREE LIGHT CHAIN RATIO: 1.7 (ref 0.26–1.65)
LAMBDA FREE LIGHT CHAINS QNT: 12 MG/L (ref 5.71–26.3)
TOTAL PROTEIN: 6.9 G/DL (ref 6.4–8.3)

## 2020-08-18 ENCOUNTER — HOSPITAL ENCOUNTER (OUTPATIENT)
Dept: INFUSION THERAPY | Age: 70
Discharge: HOME | End: 2020-08-18
Payer: MEDICARE

## 2020-08-18 DIAGNOSIS — D47.2 MONOCLONAL GAMMOPATHY: ICD-10-CM

## 2020-08-18 LAB
ALBUMIN SERPL-MCNC: 4 G/DL (ref 3.5–5.2)
ALP BLD-CCNC: 67 U/L (ref 40–129)
ALT SERPL-CCNC: 12 U/L (ref 0–40)
ANION GAP SERPL CALCULATED.3IONS-SCNC: 11 MMOL/L (ref 7–16)
AST SERPL-CCNC: 25 U/L (ref 0–39)
BASOPHILS ABSOLUTE: 0.03 E9/L (ref 0–0.2)
BASOPHILS RELATIVE PERCENT: 0.4 % (ref 0–2)
BILIRUB SERPL-MCNC: 0.3 MG/DL (ref 0–1.2)
BUN BLDV-MCNC: 15 MG/DL (ref 8–23)
CALCIUM SERPL-MCNC: 9 MG/DL (ref 8.6–10.2)
CHLORIDE BLD-SCNC: 101 MMOL/L (ref 98–107)
CO2: 29 MMOL/L (ref 22–29)
CREAT SERPL-MCNC: 0.7 MG/DL (ref 0.7–1.2)
EOSINOPHILS ABSOLUTE: 0.1 E9/L (ref 0.05–0.5)
EOSINOPHILS RELATIVE PERCENT: 1.2 % (ref 0–6)
GFR AFRICAN AMERICAN: >60
GFR NON-AFRICAN AMERICAN: >60 ML/MIN/1.73
GLUCOSE BLD-MCNC: 81 MG/DL (ref 74–99)
HCT VFR BLD CALC: 42.6 % (ref 37–54)
HEMOGLOBIN: 14.4 G/DL (ref 12.5–16.5)
IMMATURE GRANULOCYTES #: 0.03 E9/L
IMMATURE GRANULOCYTES %: 0.4 % (ref 0–5)
LACTATE DEHYDROGENASE: 160 U/L (ref 135–225)
LYMPHOCYTES ABSOLUTE: 2.11 E9/L (ref 1.5–4)
LYMPHOCYTES RELATIVE PERCENT: 25.4 % (ref 20–42)
MCH RBC QN AUTO: 32 PG (ref 26–35)
MCHC RBC AUTO-ENTMCNC: 33.8 % (ref 32–34.5)
MCV RBC AUTO: 94.7 FL (ref 80–99.9)
MONOCYTES ABSOLUTE: 0.63 E9/L (ref 0.1–0.95)
MONOCYTES RELATIVE PERCENT: 7.6 % (ref 2–12)
NEUTROPHILS ABSOLUTE: 5.42 E9/L (ref 1.8–7.3)
NEUTROPHILS RELATIVE PERCENT: 65 % (ref 43–80)
PDW BLD-RTO: 12.5 FL (ref 11.5–15)
PLATELET # BLD: 253 E9/L (ref 130–450)
PMV BLD AUTO: 8.9 FL (ref 7–12)
POTASSIUM SERPL-SCNC: 4.2 MMOL/L (ref 3.5–5)
RBC # BLD: 4.5 E12/L (ref 3.8–5.8)
SODIUM BLD-SCNC: 141 MMOL/L (ref 132–146)
WBC # BLD: 8.3 E9/L (ref 4.5–11.5)

## 2020-08-18 PROCEDURE — 80053 COMPREHEN METABOLIC PANEL: CPT

## 2020-08-18 PROCEDURE — 83883 ASSAY NEPHELOMETRY NOT SPEC: CPT

## 2020-08-18 PROCEDURE — 82232 ASSAY OF BETA-2 PROTEIN: CPT

## 2020-08-18 PROCEDURE — 86334 IMMUNOFIX E-PHORESIS SERUM: CPT

## 2020-08-18 PROCEDURE — 36415 COLL VENOUS BLD VENIPUNCTURE: CPT

## 2020-08-18 PROCEDURE — 85025 COMPLETE CBC W/AUTO DIFF WBC: CPT

## 2020-08-18 PROCEDURE — 84165 PROTEIN E-PHORESIS SERUM: CPT

## 2020-08-18 PROCEDURE — 83615 LACTATE (LD) (LDH) ENZYME: CPT

## 2020-08-18 PROCEDURE — 82784 ASSAY IGA/IGD/IGG/IGM EACH: CPT

## 2020-08-19 LAB
ALBUMIN SERPL-MCNC: 3.6 G/DL (ref 3.5–4.7)
ALPHA-1-GLOBULIN: 0.3 G/DL (ref 0.2–0.4)
ALPHA-2-GLOBULIN: 0.8 G/FL (ref 0.5–1)
BETA GLOBULIN: 0.9 G/DL (ref 0.8–1.3)
ELECTROPHORESIS: NORMAL
GAMMA GLOBULIN: 1.1 G/DL (ref 0.7–1.6)
IGA: 442 MG/DL (ref 70–400)
IGG: 967 MG/DL (ref 700–1600)
IGM: 80 MG/DL (ref 40–230)
IMMUNOFIXATION RESULT, SERUM: NORMAL
TOTAL PROTEIN: 6.7 G/DL (ref 6.4–8.3)

## 2020-08-21 LAB
BETA-2 MICROGLOBULIN: 1.1 MG/L (ref 0.6–2.4)
KAPPA FREE LIGHT CHAINS QNT: 21.3 MG/L (ref 3.3–19.4)
KAPPA/LAMBDA FREE LIGHT CHAIN RATIO: 1.61 (ref 0.26–1.65)
LAMBDA FREE LIGHT CHAINS QNT: 13.21 MG/L (ref 5.71–26.3)

## 2020-08-25 ENCOUNTER — OFFICE VISIT (OUTPATIENT)
Dept: ONCOLOGY | Age: 70
End: 2020-08-25
Payer: MEDICARE

## 2020-08-25 VITALS
SYSTOLIC BLOOD PRESSURE: 125 MMHG | OXYGEN SATURATION: 96 % | HEART RATE: 60 BPM | DIASTOLIC BLOOD PRESSURE: 73 MMHG | WEIGHT: 175.1 LBS | TEMPERATURE: 97.7 F | BODY MASS INDEX: 20.76 KG/M2

## 2020-08-25 PROCEDURE — 99212 OFFICE O/P EST SF 10 MIN: CPT

## 2020-08-25 NOTE — PROGRESS NOTES
[address]  [address]   Hematology/Oncology  Consult      Patient Name: Winnie Somers  YOB: 1950  PCP: Rich Hawkins DO   Referring Provider: [address] [place] [id number]     Reason for Consultation:   Chief Complaint   Patient presents with    Follow-up        Subjective:  No new issues, no new complaints, BL hands numbness is improved s/p surgical intervention, which he tolerated well    History of Present Illness: This pt is a very pleasant 69 yo male who presents today in referral for evaluation of an abnormal SPEP which showed a M-spike of 0.3 done by his PCP. Review of his CBC's over the last 10 years show a completely normal cell count. He states that this work up began after he noticed numbness, tingling and difficulty using his hands after he went snowmobiling some time ago. He states he gets this sensation every time he snowmobiles, but at no other time including when he is out in the cold or skiing, and not when he is holding other vibrating tools such as his lawnmower or weeding machine. He otherwise has no complaints today including no bone or back pain, no fevers, chills, sweats, or abnormal weight loss. He has not had any HA, dizziness, lightheadedness or other neuropathy. Diagnostic Data:     Past Medical History:   Diagnosis Date    Hypertension     x25 years    Mild mitral regurgitation     RBBB 7/7/2017       Patient Active Problem List    Diagnosis Date Noted    RBBB 07/07/2017    Essential hypertension, benign 05/19/2015    Tricuspid regurgitation 05/19/2015    Mild mitral regurgitation         Past Surgical History:   Procedure Laterality Date    APPENDECTOMY         Family History  Family History   Problem Relation Age of Onset    Heart Attack Mother 71        heart attack    Stroke Father 71        heart attack       Social History    TOBACCO:   reports that he has quit smoking.  He has never used smokeless tobacco.  ETOH:   reports current alcohol use. Home Medications  Prior to Admission medications    Medication Sig Start Date End Date Taking? Authorizing Provider   Cholecalciferol (VITAMIN D3) 3000 units TABS Take by mouth   Yes Historical Provider, MD   atorvastatin (LIPITOR) 20 MG tablet Take 20 mg by mouth daily  2/21/19  Yes Historical Provider, MD   amLODIPine (NORVASC) 10 MG tablet Take 5 mg by mouth daily  2/21/19  Yes Historical Provider, MD   Benfotiamine 150 MG CAPS Take by mouth   Yes Historical Provider, MD   doxazosin (CARDURA) 4 MG tablet Take 4 mg by mouth nightly   Yes Historical Provider, MD   lisinopril (PRINIVIL;ZESTRIL) 20 MG tablet Take 20 mg by mouth daily. Yes Historical Provider, MD       Allergies  No Known Allergies    Review of Systems:    Constitutional:  No fever chills or rigors. Eyes: No changes in vision, discharge, or pain  ENT: No Headaches, hearing loss or vertigo. No mouth sores or sore throat. No change in taste or smell. Cardiovascular: No chest discomfort, dyspnea on exertion, palpitations or loss of consciousness. or phlebitis. Respiratory: Has no cough or wheezing, Has no sputum production. Has no hemoptysis, Has no pleuritic pain, . Gastrointestinal: No abdominal pain, appetite loss, blood in stools. No change in bowel habits. No hematemesis   Genitourinary: Patient acknowledges no dysuria, trouble voiding, or hematuria. No nocturia or increased frequency. Musculoskeletal: No gait disturbance, weakness or joint complaints. Integumentary: No rash or pruritis. Neurological: No headache, diplopia, change in muscle strength. + numbness or tingling ONLY w/ riding his snowmobile. No change in gait, balance, coordination, mood, affect, memory, mentation, behavior. Psychiatric: No anxiety, or depression. Endocrine: No temperature intolerance. No excessive thirst, fluid intake, or urination. No tremor.    Hematologic/Lymphatic: No abnormal bruising or bleeding, blood clots or swollen lymph nodes. Allergic/Immunologic: No nasal congestion or hives. Objective  /73 (Site: Left Upper Arm, Position: Sitting, Cuff Size: Medium Adult)   Pulse 60   Temp 97.7 °F (36.5 °C) (Temporal)   Wt 175 lb 1.6 oz (79.4 kg)   SpO2 96%   BMI 20.76 kg/m²     Physical Performance Status    Physical Exam:  General: AAO to person, place, time, and purpose, appears stated age, cooperative, no acute distress, pleasant   Head and neck : PERRLA, EOMI . Sclera non icteric. Oropharynx : Clear  Neck: no JVD,  no adenopathy  LYMPHATICS : No LAD  Lungs: Clear to auscultation   Heart: Regular rate and regular rhythm, no murmur, normal S1, S2  Abdomen: Soft, non-tender;no masses, no organomegaly  Extremities: No edema,no cyanosis, no clubbing  Skin:  No rash  Neurologic:Cranial nerves grossly intact. No focal motor or sensory deficits    Recent Laboratory Data-   Lab Results   Component Value Date    WBC 8.3 08/18/2020    HGB 14.4 08/18/2020    HCT 42.6 08/18/2020    MCV 94.7 08/18/2020     08/18/2020    LYMPHOPCT 25.4 08/18/2020    RBC 4.50 08/18/2020    MCH 32.0 08/18/2020    MCHC 33.8 08/18/2020    RDW 12.5 08/18/2020    NEUTOPHILPCT 65.0 08/18/2020    MONOPCT 7.6 08/18/2020    BASOPCT 0.4 08/18/2020    NEUTROABS 5.42 08/18/2020    LYMPHSABS 2.11 08/18/2020    MONOSABS 0.63 08/18/2020    EOSABS 0.10 08/18/2020    BASOSABS 0.03 08/18/2020       Lab Results   Component Value Date     08/18/2020    K 4.2 08/18/2020     08/18/2020    CO2 29 08/18/2020    BUN 15 08/18/2020    CREATININE 0.7 08/18/2020    GLUCOSE 81 08/18/2020    CALCIUM 9.0 08/18/2020    PROT 6.7 08/18/2020    LABALBU 3.6 08/18/2020    LABALBU 4.0 08/18/2020    BILITOT 0.3 08/18/2020    ALKPHOS 67 08/18/2020    AST 25 08/18/2020    ALT 12 08/18/2020    LABGLOM >60 08/18/2020    GFRAA >60 08/18/2020       No results found for: IRON, TIBC, [address] [facility]        Radiology-    No results found.       ASSESSMENT/PLAN Anup Navas was seen today for follow-up. Diagnoses and all orders for this visit:    Monoclonal gammopathy    67 yo male  Abnormal M-spike of 0.3  HTN    - Will pursue work up to r/o MM  - Repeat SPEP, add BENI and UPEP  - Repeat CBC/CMP  - Ig's  - K/L FLC and FLCR  - B-2 microglobulin  - LDH  - Depending on above results, may need a BMBx. However there is a decent possibility this represents a MGUS  - RTC in 2 weeks to review results    7/23/19  - Ig's normal  - Beta-2 1.3  - SPEP w/ faint restriction in gamma region and normal polyclonal IG's. Spoke with pathology and this was an unusual result, possibly an IgD or IgE. K/L FLC of 2.18/1.29, with FLCR of 1.69 (very slightly elevated)  - CBC and CMP WNL  - LDH normal  - Above abnormalities are slight, and given his otherwise lack of symptoms (except when on snowmobile), will elect to proceed with observation at this time  - RTC in 3 months w/ SPEP w/ BENI, Ig's, UPEP and BENI, and K/L FLC    10/22/19  - Labs today pending  - Saw neurology and diagnosed with a cervical radiculopathy (via EMG) which is believed to be causing him numbness. He will send me the reports as he forgot to bring them in today  - RTC in 4 months with repeat labs    2/25/20  - Labs from 10/19 stable  - Cervical radiculopathy causing BL hand numbness with snowmobile activities. Going for surgery with NSG at [facility] in 4/20   - Cont to monitor MM panel, will move to q 6 months  - RTC in 6 months    8/25/20  - Labs from last week with stable CBC/CMP  - IgA slightly elevated at 442, IgG/M normal  - Beta 2 was 1.1  - FLCR WNL at 1.61  - SPEP without M-spike  - Surgery went well at [facility], almost no down time after, improved strength in L hand.  Anxious to try snowmobiling again      Ashley Garsia MD   Electronically signed 8/25/2020 at 2:52 PM

## 2021-02-19 ENCOUNTER — HOSPITAL ENCOUNTER (OUTPATIENT)
Dept: INFUSION THERAPY | Age: 71
Discharge: HOME | End: 2021-02-19
Payer: MEDICARE

## 2021-02-19 DIAGNOSIS — D47.2 MONOCLONAL GAMMOPATHY: ICD-10-CM

## 2021-02-19 LAB
ALBUMIN SERPL-MCNC: 4.3 G/DL (ref 3.5–5.2)
ALP BLD-CCNC: 58 U/L (ref 40–129)
ALT SERPL-CCNC: 10 U/L (ref 0–40)
ANION GAP SERPL CALCULATED.3IONS-SCNC: 8 MMOL/L (ref 7–16)
AST SERPL-CCNC: 27 U/L (ref 0–39)
BASOPHILS ABSOLUTE: 0.04 E9/L (ref 0–0.2)
BASOPHILS RELATIVE PERCENT: 0.6 % (ref 0–2)
BILIRUB SERPL-MCNC: 0.4 MG/DL (ref 0–1.2)
BUN BLDV-MCNC: 18 MG/DL (ref 8–23)
CALCIUM SERPL-MCNC: 9.2 MG/DL (ref 8.6–10.2)
CHLORIDE BLD-SCNC: 103 MMOL/L (ref 98–107)
CO2: 29 MMOL/L (ref 22–29)
CREAT SERPL-MCNC: 0.7 MG/DL (ref 0.7–1.2)
EOSINOPHILS ABSOLUTE: 0.11 E9/L (ref 0.05–0.5)
EOSINOPHILS RELATIVE PERCENT: 1.5 % (ref 0–6)
GFR AFRICAN AMERICAN: >60
GFR NON-AFRICAN AMERICAN: >60 ML/MIN/1.73
GLUCOSE BLD-MCNC: 99 MG/DL (ref 74–99)
HCT VFR BLD CALC: 44 % (ref 37–54)
HEMOGLOBIN: 14.7 G/DL (ref 12.5–16.5)
IMMATURE GRANULOCYTES #: 0.02 E9/L
IMMATURE GRANULOCYTES %: 0.3 % (ref 0–5)
LACTATE DEHYDROGENASE: 155 U/L (ref 135–225)
LYMPHOCYTES ABSOLUTE: 2.21 E9/L (ref 1.5–4)
LYMPHOCYTES RELATIVE PERCENT: 30.8 % (ref 20–42)
MCH RBC QN AUTO: 31.6 PG (ref 26–35)
MCHC RBC AUTO-ENTMCNC: 33.4 % (ref 32–34.5)
MCV RBC AUTO: 94.6 FL (ref 80–99.9)
MONOCYTES ABSOLUTE: 0.68 E9/L (ref 0.1–0.95)
MONOCYTES RELATIVE PERCENT: 9.5 % (ref 2–12)
NEUTROPHILS ABSOLUTE: 4.12 E9/L (ref 1.8–7.3)
NEUTROPHILS RELATIVE PERCENT: 57.3 % (ref 43–80)
PDW BLD-RTO: 12.6 FL (ref 11.5–15)
PLATELET # BLD: 234 E9/L (ref 130–450)
PMV BLD AUTO: 9.3 FL (ref 7–12)
POTASSIUM SERPL-SCNC: 4.3 MMOL/L (ref 3.5–5)
RBC # BLD: 4.65 E12/L (ref 3.8–5.8)
SODIUM BLD-SCNC: 140 MMOL/L (ref 132–146)
TOTAL PROTEIN: 7.2 G/DL (ref 6.4–8.3)
WBC # BLD: 7.2 E9/L (ref 4.5–11.5)

## 2021-02-19 PROCEDURE — 82784 ASSAY IGA/IGD/IGG/IGM EACH: CPT

## 2021-02-19 PROCEDURE — 83883 ASSAY NEPHELOMETRY NOT SPEC: CPT

## 2021-02-19 PROCEDURE — 83615 LACTATE (LD) (LDH) ENZYME: CPT

## 2021-02-19 PROCEDURE — 36415 COLL VENOUS BLD VENIPUNCTURE: CPT

## 2021-02-19 PROCEDURE — 84165 PROTEIN E-PHORESIS SERUM: CPT

## 2021-02-19 PROCEDURE — 80053 COMPREHEN METABOLIC PANEL: CPT

## 2021-02-19 PROCEDURE — 82232 ASSAY OF BETA-2 PROTEIN: CPT

## 2021-02-19 PROCEDURE — 85025 COMPLETE CBC W/AUTO DIFF WBC: CPT

## 2021-02-19 PROCEDURE — 86334 IMMUNOFIX E-PHORESIS SERUM: CPT

## 2021-02-22 LAB
ALBUMIN SERPL-MCNC: 3.6 G/DL (ref 3.5–4.7)
ALPHA-1-GLOBULIN: 0.2 G/DL (ref 0.2–0.4)
ALPHA-2-GLOBULIN: 0.7 G/FL (ref 0.5–1)
BETA GLOBULIN: 1 G/DL (ref 0.8–1.3)
ELECTROPHORESIS: NORMAL
GAMMA GLOBULIN: 1.3 G/DL (ref 0.7–1.6)
IGA: 382 MG/DL (ref 70–400)
IGG: 942 MG/DL (ref 700–1600)
IGM: 81 MG/DL (ref 40–230)
IMMUNOFIXATION RESULT, SERUM: NORMAL
KAPPA FREE LIGHT CHAINS QNT: 21.27 MG/L (ref 3.3–19.4)
KAPPA/LAMBDA FREE LIGHT CHAIN RATIO: 1.42 (ref 0.26–1.65)
LAMBDA FREE LIGHT CHAINS QNT: 14.94 MG/L (ref 5.71–26.3)

## 2021-02-23 ENCOUNTER — OFFICE VISIT (OUTPATIENT)
Dept: ONCOLOGY | Age: 71
End: 2021-02-23
Payer: MEDICARE

## 2021-02-23 VITALS
WEIGHT: 198.6 LBS | BODY MASS INDEX: 23.45 KG/M2 | SYSTOLIC BLOOD PRESSURE: 121 MMHG | RESPIRATION RATE: 18 BRPM | TEMPERATURE: 97.2 F | OXYGEN SATURATION: 95 % | HEART RATE: 70 BPM | DIASTOLIC BLOOD PRESSURE: 62 MMHG | HEIGHT: 77 IN

## 2021-02-23 DIAGNOSIS — D47.2 MONOCLONAL GAMMOPATHY: Primary | ICD-10-CM

## 2021-02-23 PROCEDURE — 99212 OFFICE O/P EST SF 10 MIN: CPT

## 2021-02-23 NOTE — PROGRESS NOTES
[address]   Hematology/Oncology  Consult      Patient Name: Jocelyne Hawkins  YOB: 1950  PCP: Pardeep Pathak DO   Referring Provider: [address] [place] [address] Dr RT Butler / Emily Thibodeaux [id number]     Reason for Consultation:   No chief complaint on file. Subjective:  No new complaints, BL hands numbness remains improved s/p surgical intervention. Following with derm for SCC's. No new issues in interim    History of Present Illness: This pt is a very pleasant 69 yo male who presents today in referral for evaluation of an abnormal SPEP which showed a M-spike of 0.3 done by his PCP. Review of his CBC's over the last 10 years show a completely normal cell count. He states that this work up began after he noticed numbness, tingling and difficulty using his hands after he went snowmobiling some time ago. He states he gets this sensation every time he snowmobiles, but at no other time including when he is out in the cold or skiing, and not when he is holding other vibrating tools such as his lawnmower or weeding machine. He otherwise has no complaints today including no bone or back pain, no fevers, chills, sweats, or abnormal weight loss. He has not had any HA, dizziness, lightheadedness or other neuropathy. Diagnostic Data:     Past Medical History:   Diagnosis Date    Hypertension     x25 years    Mild mitral regurgitation     RBBB 7/7/2017       Patient Active Problem List    Diagnosis Date Noted    RBBB 07/07/2017    Essential hypertension, benign 05/19/2015    Tricuspid regurgitation 05/19/2015    Mild mitral regurgitation         Past Surgical History:   Procedure Laterality Date    APPENDECTOMY         Family History  Family History   Problem Relation Age of Onset    Heart Attack Mother 71        heart attack    Stroke Father 71        heart attack       Social History    TOBACCO:   reports that he has quit smoking.  He has never used smokeless clots or swollen lymph nodes. Allergic/Immunologic: No nasal congestion or hives. Objective  /62   Pulse 70   Temp 97.2 °F (36.2 °C)   Resp 18   Ht 6' 5\" (1.956 m)   Wt 198 lb 9.6 oz (90.1 kg)   SpO2 95%   BMI 23.55 kg/m²     Physical Performance Status    Physical Exam:  General: AAO to person, place, time, and purpose, appears stated age, cooperative, no acute distress, pleasant   Head and neck : PERRLA, EOMI . Sclera non icteric. Oropharynx : Clear  Neck: no JVD,  no adenopathy  LYMPHATICS : No LAD  Lungs: Clear to auscultation   Heart: Regular rate and regular rhythm, no murmur, normal S1, S2  Abdomen: Soft, non-tender;no masses, no organomegaly  Extremities: No edema,no cyanosis, no clubbing  Skin:  No rash  Neurologic:Cranial nerves grossly intact. No focal motor or sensory deficits    Recent Laboratory Data-   Lab Results   Component Value Date    WBC 7.2 02/19/2021    HGB 14.7 02/19/2021    HCT 44.0 02/19/2021    MCV 94.6 02/19/2021     02/19/2021    LYMPHOPCT 30.8 02/19/2021    RBC 4.65 02/19/2021    MCH 31.6 02/19/2021    MCHC 33.4 02/19/2021    RDW 12.6 02/19/2021    NEUTOPHILPCT 57.3 02/19/2021    MONOPCT 9.5 02/19/2021    BASOPCT 0.6 02/19/2021    NEUTROABS 4.12 02/19/2021    LYMPHSABS 2.21 02/19/2021    MONOSABS 0.68 02/19/2021    EOSABS 0.11 02/19/2021    BASOSABS 0.04 02/19/2021       Lab Results   Component Value Date     02/19/2021    K 4.3 02/19/2021     02/19/2021    CO2 29 02/19/2021    BUN 18 02/19/2021    CREATININE 0.7 02/19/2021    GLUCOSE 99 02/19/2021    CALCIUM 9.2 02/19/2021    PROT 7.2 02/19/2021    LABALBU 4.3 02/19/2021    LABALBU 3.6 02/19/2021    BILITOT 0.4 02/19/2021    ALKPHOS 58 02/19/2021    AST 27 02/19/2021    ALT 10 02/19/2021    LABGLOM >60 02/19/2021    GFRAA >60 02/19/2021       No results found for: IRON, TIBC, [address] [facility]        Radiology-    No results found.       ASSESSMENT/PLAN :    Diagnoses and all orders for this visit:    Monoclonal gammopathy    67 yo male  Abnormal M-spike of 0.3  HTN    - Will pursue work up to r/o MM  - Repeat SPEP, add BENI and UPEP  - Repeat CBC/CMP  - Ig's  - K/L FLC and FLCR  - B-2 microglobulin  - LDH  - Depending on above results, may need a BMBx. However there is a decent possibility this represents a MGUS  - RTC in 2 weeks to review results    7/23/19  - Ig's normal  - Beta-2 1.3  - SPEP w/ faint restriction in gamma region and normal polyclonal IG's. Spoke with pathology and this was an unusual result, possibly an IgD or IgE. K/L FLC of 2.18/1.29, with FLCR of 1.69 (very slightly elevated)  - CBC and CMP WNL  - LDH normal  - Above abnormalities are slight, and given his otherwise lack of symptoms (except when on snowmobile), will elect to proceed with observation at this time  - RTC in 3 months w/ SPEP w/ BENI, Ig's, UPEP and BENI, and K/L FLC    10/22/19  - Labs today pending  - Saw neurology and diagnosed with a cervical radiculopathy (via EMG) which is believed to be causing him numbness. He will send me the reports as he forgot to bring them in today  - RTC in 4 months with repeat labs    2/25/20  - Labs from 10/19 stable  - Cervical radiculopathy causing BL hand numbness with snowmobile activities. Going for surgery with NSG at [facility] in 4/20   - Cont to monitor MM panel, will move to q 6 months  - RTC in 6 months    8/25/20  - Labs from last week with stable CBC/CMP  - IgA slightly elevated at 442, IgG/M normal  - Beta 2 was 1.1  - FLCR WNL at 1.61  - SPEP without M-spike  - Surgery went well at [facility], almost no down time after, improved strength in L hand.  Anxious to try snowmobiling again    2/23/21  - CBC/CMP remain WNL  - SPEP w/o M-spike  - K/L FLCR WNL and stable  - Ig's WNL  - Continue observation alone at this time  - RTC in 6 months      Martha Lopez, MD   Electronically signed 2/23/2021 at 10:52 AM

## 2021-02-24 LAB — BETA-2 MICROGLOBULIN: 1.7 MG/L (ref 0.6–2.4)

## 2021-08-23 ENCOUNTER — HOSPITAL ENCOUNTER (OUTPATIENT)
Dept: INFUSION THERAPY | Age: 71
Discharge: HOME | End: 2021-08-23
Payer: MEDICARE

## 2021-08-23 DIAGNOSIS — D47.2 MONOCLONAL GAMMOPATHY: ICD-10-CM

## 2021-08-23 LAB
ALBUMIN SERPL-MCNC: 4.3 G/DL (ref 3.5–5.2)
ALP BLD-CCNC: 65 U/L (ref 40–129)
ALT SERPL-CCNC: 13 U/L (ref 0–40)
ANION GAP SERPL CALCULATED.3IONS-SCNC: 9 MMOL/L (ref 7–16)
AST SERPL-CCNC: 27 U/L (ref 0–39)
BASOPHILS ABSOLUTE: 0.03 E9/L (ref 0–0.2)
BASOPHILS RELATIVE PERCENT: 0.4 % (ref 0–2)
BILIRUB SERPL-MCNC: 0.4 MG/DL (ref 0–1.2)
BUN BLDV-MCNC: 14 MG/DL (ref 6–23)
CALCIUM SERPL-MCNC: 9.2 MG/DL (ref 8.6–10.2)
CHLORIDE BLD-SCNC: 103 MMOL/L (ref 98–107)
CO2: 27 MMOL/L (ref 22–29)
CREAT SERPL-MCNC: 0.7 MG/DL (ref 0.7–1.2)
EOSINOPHILS ABSOLUTE: 0.12 E9/L (ref 0.05–0.5)
EOSINOPHILS RELATIVE PERCENT: 1.6 % (ref 0–6)
GFR AFRICAN AMERICAN: >60
GFR NON-AFRICAN AMERICAN: >60 ML/MIN/1.73
GLUCOSE BLD-MCNC: 107 MG/DL (ref 74–99)
HCT VFR BLD CALC: 44.3 % (ref 37–54)
HEMOGLOBIN: 14.8 G/DL (ref 12.5–16.5)
IMMATURE GRANULOCYTES #: 0.01 E9/L
IMMATURE GRANULOCYTES %: 0.1 % (ref 0–5)
LYMPHOCYTES ABSOLUTE: 2.05 E9/L (ref 1.5–4)
LYMPHOCYTES RELATIVE PERCENT: 27.3 % (ref 20–42)
MCH RBC QN AUTO: 31.5 PG (ref 26–35)
MCHC RBC AUTO-ENTMCNC: 33.4 % (ref 32–34.5)
MCV RBC AUTO: 94.3 FL (ref 80–99.9)
MONOCYTES ABSOLUTE: 0.6 E9/L (ref 0.1–0.95)
MONOCYTES RELATIVE PERCENT: 8 % (ref 2–12)
NEUTROPHILS ABSOLUTE: 4.7 E9/L (ref 1.8–7.3)
NEUTROPHILS RELATIVE PERCENT: 62.6 % (ref 43–80)
PDW BLD-RTO: 12.6 FL (ref 11.5–15)
PLATELET # BLD: 235 E9/L (ref 130–450)
PMV BLD AUTO: 9.2 FL (ref 7–12)
POTASSIUM SERPL-SCNC: 4.5 MMOL/L (ref 3.5–5)
RBC # BLD: 4.7 E12/L (ref 3.8–5.8)
SODIUM BLD-SCNC: 139 MMOL/L (ref 132–146)
TOTAL PROTEIN: 7.3 G/DL (ref 6.4–8.3)
WBC # BLD: 7.5 E9/L (ref 4.5–11.5)

## 2021-08-23 PROCEDURE — 36415 COLL VENOUS BLD VENIPUNCTURE: CPT

## 2021-08-23 PROCEDURE — 85025 COMPLETE CBC W/AUTO DIFF WBC: CPT

## 2021-08-23 PROCEDURE — 82232 ASSAY OF BETA-2 PROTEIN: CPT

## 2021-08-23 PROCEDURE — 83883 ASSAY NEPHELOMETRY NOT SPEC: CPT

## 2021-08-23 PROCEDURE — 82784 ASSAY IGA/IGD/IGG/IGM EACH: CPT

## 2021-08-23 PROCEDURE — 80053 COMPREHEN METABOLIC PANEL: CPT

## 2021-08-23 PROCEDURE — 84165 PROTEIN E-PHORESIS SERUM: CPT

## 2021-08-23 PROCEDURE — 86334 IMMUNOFIX E-PHORESIS SERUM: CPT

## 2021-08-24 ENCOUNTER — OFFICE VISIT (OUTPATIENT)
Dept: ONCOLOGY | Age: 71
End: 2021-08-24
Payer: MEDICARE

## 2021-08-24 VITALS
DIASTOLIC BLOOD PRESSURE: 83 MMHG | BODY MASS INDEX: 21.69 KG/M2 | SYSTOLIC BLOOD PRESSURE: 159 MMHG | RESPIRATION RATE: 18 BRPM | HEART RATE: 55 BPM | HEIGHT: 77 IN | WEIGHT: 183.7 LBS | OXYGEN SATURATION: 97 % | TEMPERATURE: 97.3 F

## 2021-08-24 DIAGNOSIS — D47.2 MONOCLONAL GAMMOPATHY: Primary | ICD-10-CM

## 2021-08-24 PROCEDURE — 99212 OFFICE O/P EST SF 10 MIN: CPT

## 2021-08-24 NOTE — PROGRESS NOTES
[address]  [facility]   Hematology/Oncology  Consult      Patient Name: Rohith Pereira  YOB: 1950  PCP: Jade Nevarez DO   Referring Provider: [phone] / [place] [id number]     Reason for Consultation:   No chief complaint on file. Subjective:  No new complaints, BL hands numbness remains improved s/p surgical intervention. Following with derm for SCC's. No new issues in interim. Will try snowmobiling again in the winter    History of Present Illness: This pt is a very pleasant 69 yo male who presents today in referral for evaluation of an abnormal SPEP which showed a M-spike of 0.3 done by his PCP. Review of his CBC's over the last 10 years show a completely normal cell count. He states that this work up began after he noticed numbness, tingling and difficulty using his hands after he went snowmobiling some time ago. He states he gets this sensation every time he snowmobiles, but at no other time including when he is out in the cold or skiing, and not when he is holding other vibrating tools such as his lawnmower or weeding machine. He otherwise has no complaints today including no bone or back pain, no fevers, chills, sweats, or abnormal weight loss. He has not had any HA, dizziness, lightheadedness or other neuropathy.     Diagnostic Data:     Past Medical History:   Diagnosis Date    Hypertension     x25 years    Mild mitral regurgitation     RBBB 7/7/2017       Patient Active Problem List    Diagnosis Date Noted    RBBB 07/07/2017    Essential hypertension, benign 05/19/2015    Tricuspid regurgitation 05/19/2015    Mild mitral regurgitation         Past Surgical History:   Procedure Laterality Date    APPENDECTOMY         Family History  Family History   Problem Relation Age of Onset    Heart Attack Mother 71        heart attack    Stroke Father 71        heart attack       Social History    TOBACCO:   reports that he has quit smoking. He has never used smokeless tobacco.  ETOH:   reports current alcohol use. Home Medications  Prior to Admission medications    Medication Sig Start Date End Date Taking? Authorizing Provider   Cholecalciferol (VITAMIN D3) 3000 units TABS Take by mouth    Historical Provider, MD   atorvastatin (LIPITOR) 20 MG tablet Take 20 mg by mouth daily  2/21/19   Historical Provider, MD   amLODIPine (NORVASC) 10 MG tablet Take 5 mg by mouth daily  2/21/19   Historical Provider, MD   Benfotiamine 150 MG CAPS Take by mouth    Historical Provider, MD   doxazosin (CARDURA) 4 MG tablet Take 4 mg by mouth nightly    Historical Provider, MD   lisinopril (PRINIVIL;ZESTRIL) 20 MG tablet Take 20 mg by mouth daily. Historical Provider, MD       Allergies  No Known Allergies    Review of Systems:    Constitutional:  No fever chills or rigors. Eyes: No changes in vision, discharge, or pain  ENT: No Headaches, hearing loss or vertigo. No mouth sores or sore throat. No change in taste or smell. Cardiovascular: No chest discomfort, dyspnea on exertion, palpitations or loss of consciousness. or phlebitis. Respiratory: Has no cough or wheezing, Has no sputum production. Has no hemoptysis, Has no pleuritic pain, . Gastrointestinal: No abdominal pain, appetite loss, blood in stools. No change in bowel habits. No hematemesis   Genitourinary: Patient acknowledges no dysuria, trouble voiding, or hematuria. No nocturia or increased frequency. Musculoskeletal: No gait disturbance, weakness or joint complaints. Integumentary: No rash or pruritis. Neurological: No headache, diplopia, change in muscle strength. + numbness or tingling ONLY w/ riding his snowmobile. No change in gait, balance, coordination, mood, affect, memory, mentation, behavior. Psychiatric: No anxiety, or depression. Endocrine: No temperature intolerance. No excessive thirst, fluid intake, or urination. No tremor.    Hematologic/Lymphatic: No abnormal bruising or bleeding, blood clots or swollen lymph nodes. Allergic/Immunologic: No nasal congestion or hives. Objective  There were no vitals taken for this visit. Physical Performance Status    Physical Exam:  General: AAO to person, place, time, and purpose, appears stated age, cooperative, no acute distress, pleasant   Head and neck : PERRLA, EOMI . Sclera non icteric. Oropharynx : Clear  Neck: no JVD,  no adenopathy  LYMPHATICS : No LAD  Lungs: Clear to auscultation   Heart: Regular rate and regular rhythm, no murmur, normal S1, S2  Abdomen: Soft, non-tender;no masses, no organomegaly  Extremities: No edema,no cyanosis, no clubbing  Skin:  No rash  Neurologic:Cranial nerves grossly intact. No focal motor or sensory deficits    Recent Laboratory Data-   Lab Results   Component Value Date    WBC 7.5 08/23/2021    HGB 14.8 08/23/2021    HCT 44.3 08/23/2021    MCV 94.3 08/23/2021     08/23/2021    LYMPHOPCT 27.3 08/23/2021    RBC 4.70 08/23/2021    MCH 31.5 08/23/2021    MCHC 33.4 08/23/2021    RDW 12.6 08/23/2021    NEUTOPHILPCT 62.6 08/23/2021    MONOPCT 8.0 08/23/2021    BASOPCT 0.4 08/23/2021    NEUTROABS 4.70 08/23/2021    LYMPHSABS 2.05 08/23/2021    MONOSABS 0.60 08/23/2021    EOSABS 0.12 08/23/2021    BASOSABS 0.03 08/23/2021       Lab Results   Component Value Date     08/23/2021    K 4.5 08/23/2021     08/23/2021    CO2 27 08/23/2021    BUN 14 08/23/2021    CREATININE 0.7 08/23/2021    GLUCOSE 107 (H) 08/23/2021    CALCIUM 9.2 08/23/2021    PROT 7.3 08/23/2021    LABALBU 4.3 08/23/2021    BILITOT 0.4 08/23/2021    ALKPHOS 65 08/23/2021    AST 27 08/23/2021    ALT 13 08/23/2021    LABGLOM >60 08/23/2021    GFRAA >60 08/23/2021       No results found for: IRON, TIBC, [address] [facility]        Radiology-    No results found. ASSESSMENT/PLAN :    There are no diagnoses linked to this encounter. 67 yo male  Abnormal M-spike of 0.3  HTN    - Will pursue work up to r/o MM  - Repeat SPEP, add BENI and UPEP  - Repeat CBC/CMP  - Ig's  - K/L FLC and FLCR  - B-2 microglobulin  - LDH  - Depending on above results, may need a BMBx. However there is a decent possibility this represents a MGUS  - RTC in 2 weeks to review results    7/23/19  - Ig's normal  - Beta-2 1.3  - SPEP w/ faint restriction in gamma region and normal polyclonal IG's. Spoke with pathology and this was an unusual result, possibly an IgD or IgE. K/L FLC of 2.18/1.29, with FLCR of 1.69 (very slightly elevated)  - CBC and CMP WNL  - LDH normal  - Above abnormalities are slight, and given his otherwise lack of symptoms (except when on snowmobile), will elect to proceed with observation at this time  - RTC in 3 months w/ SPEP w/ BENI, Ig's, UPEP and BENI, and K/L FLC    10/22/19  - Labs today pending  - Saw neurology and diagnosed with a cervical radiculopathy (via EMG) which is believed to be causing him numbness. He will send me the reports as he forgot to bring them in today  - RTC in 4 months with repeat labs    2/25/20  - Labs from 10/19 stable  - Cervical radiculopathy causing BL hand numbness with snowmobile activities. Going for surgery with NSG at [facility] in 4/20   - Cont to monitor MM panel, will move to q 6 months  - RTC in 6 months    8/25/20  - Labs from last week with stable CBC/CMP  - IgA slightly elevated at 442, IgG/M normal  - Beta 2 was 1.1  - FLCR WNL at 1.61  - SPEP without M-spike  - Surgery went well at [facility], almost no down time after, improved strength in L hand.  Anxious to try snowmobiling again    2/23/21  - CBC/CMP remain WNL  - SPEP w/o M-spike  - K/L FLCR WNL and stable  - Ig's WNL  - Continue observation alone at this time  - RTC in 6 months    8/24/21  - MGUS  - CBC/CMP remain WNL  - SPEP, K/L FLCR, Ig's all pending  - Continue observation alone at this time  - RTC in 6 months, labs week before      Jenna Hernandez MD   Electronically signed 8/24/2021 at 11:50 AM

## 2021-08-25 LAB
ALBUMIN SERPL-MCNC: 3.8 G/DL (ref 3.5–4.7)
ALPHA-1-GLOBULIN: 0.2 G/DL (ref 0.2–0.4)
ALPHA-2-GLOBULIN: 0.7 G/FL (ref 0.5–1)
BETA GLOBULIN: 1 G/DL (ref 0.8–1.3)
BETA-2 MICROGLOBULIN: 1.4 MG/L (ref 0.6–2.4)
ELECTROPHORESIS: NORMAL
GAMMA GLOBULIN: 1.2 G/DL (ref 0.7–1.6)
IGA: 392 MG/DL (ref 70–400)
IGG: 954 MG/DL (ref 700–1600)
IGM: 73 MG/DL (ref 40–230)
IMMUNOFIXATION RESULT, SERUM: NORMAL

## 2021-08-26 LAB
KAPPA FREE LIGHT CHAINS QNT: 21.78 MG/L (ref 3.3–19.4)
KAPPA/LAMBDA FREE LIGHT CHAIN RATIO: 1.47 (ref 0.26–1.65)
LAMBDA FREE LIGHT CHAINS QNT: 14.84 MG/L (ref 5.71–26.3)

## 2022-02-21 ENCOUNTER — HOSPITAL ENCOUNTER (OUTPATIENT)
Dept: INFUSION THERAPY | Age: 72
Discharge: HOME | End: 2022-02-21
Payer: MEDICARE

## 2022-02-21 DIAGNOSIS — D47.2 MONOCLONAL GAMMOPATHY: ICD-10-CM

## 2022-02-21 LAB
ALBUMIN SERPL-MCNC: 4.2 G/DL (ref 3.5–5.2)
ALP BLD-CCNC: 64 U/L (ref 40–129)
ALT SERPL-CCNC: 8 U/L (ref 0–40)
ANION GAP SERPL CALCULATED.3IONS-SCNC: 9 MMOL/L (ref 7–16)
AST SERPL-CCNC: 26 U/L (ref 0–39)
BASOPHILS ABSOLUTE: 0.04 E9/L (ref 0–0.2)
BASOPHILS RELATIVE PERCENT: 0.6 % (ref 0–2)
BILIRUB SERPL-MCNC: 0.4 MG/DL (ref 0–1.2)
BUN BLDV-MCNC: 19 MG/DL (ref 6–23)
CALCIUM SERPL-MCNC: 9.5 MG/DL (ref 8.6–10.2)
CHLORIDE BLD-SCNC: 103 MMOL/L (ref 98–107)
CO2: 29 MMOL/L (ref 22–29)
CREAT SERPL-MCNC: 0.8 MG/DL (ref 0.7–1.2)
EOSINOPHILS ABSOLUTE: 0.13 E9/L (ref 0.05–0.5)
EOSINOPHILS RELATIVE PERCENT: 1.9 % (ref 0–6)
GFR AFRICAN AMERICAN: >60
GFR NON-AFRICAN AMERICAN: >60 ML/MIN/1.73
GLUCOSE BLD-MCNC: 109 MG/DL (ref 74–99)
HCT VFR BLD CALC: 47.1 % (ref 37–54)
HEMOGLOBIN: 15.4 G/DL (ref 12.5–16.5)
IMMATURE GRANULOCYTES #: 0.02 E9/L
IMMATURE GRANULOCYTES %: 0.3 % (ref 0–5)
LYMPHOCYTES ABSOLUTE: 1.67 E9/L (ref 1.5–4)
LYMPHOCYTES RELATIVE PERCENT: 25 % (ref 20–42)
MCH RBC QN AUTO: 31.7 PG (ref 26–35)
MCHC RBC AUTO-ENTMCNC: 32.7 % (ref 32–34.5)
MCV RBC AUTO: 96.9 FL (ref 80–99.9)
MONOCYTES ABSOLUTE: 0.58 E9/L (ref 0.1–0.95)
MONOCYTES RELATIVE PERCENT: 8.7 % (ref 2–12)
NEUTROPHILS ABSOLUTE: 4.23 E9/L (ref 1.8–7.3)
NEUTROPHILS RELATIVE PERCENT: 63.5 % (ref 43–80)
PDW BLD-RTO: 12.4 FL (ref 11.5–15)
PLATELET # BLD: 233 E9/L (ref 130–450)
PMV BLD AUTO: 9.3 FL (ref 7–12)
POTASSIUM SERPL-SCNC: 4.3 MMOL/L (ref 3.5–5)
RBC # BLD: 4.86 E12/L (ref 3.8–5.8)
SODIUM BLD-SCNC: 141 MMOL/L (ref 132–146)
TOTAL PROTEIN: 7 G/DL (ref 6.4–8.3)
WBC # BLD: 6.7 E9/L (ref 4.5–11.5)

## 2022-02-21 PROCEDURE — 83883 ASSAY NEPHELOMETRY NOT SPEC: CPT

## 2022-02-21 PROCEDURE — 80053 COMPREHEN METABOLIC PANEL: CPT

## 2022-02-21 PROCEDURE — 84165 PROTEIN E-PHORESIS SERUM: CPT

## 2022-02-21 PROCEDURE — 85025 COMPLETE CBC W/AUTO DIFF WBC: CPT

## 2022-02-21 PROCEDURE — 82232 ASSAY OF BETA-2 PROTEIN: CPT

## 2022-02-21 PROCEDURE — 82784 ASSAY IGA/IGD/IGG/IGM EACH: CPT

## 2022-02-21 PROCEDURE — 86334 IMMUNOFIX E-PHORESIS SERUM: CPT

## 2022-02-21 PROCEDURE — 36415 COLL VENOUS BLD VENIPUNCTURE: CPT

## 2022-02-23 LAB
ALBUMIN SERPL-MCNC: 3.6 G/DL (ref 3.5–4.7)
ALPHA-1-GLOBULIN: 0.2 G/DL (ref 0.2–0.4)
ALPHA-2-GLOBULIN: 0.8 G/DL (ref 0.5–1)
BETA GLOBULIN: 1.1 G/DL (ref 0.8–1.3)
ELECTROPHORESIS: NORMAL
GAMMA GLOBULIN: 1.2 G/DL (ref 0.7–1.6)
IGA: 404 MG/DL (ref 70–400)
IGG: 984 MG/DL (ref 700–1600)
IGM: 75 MG/DL (ref 40–230)
IMMUNOFIXATION RESULT, SERUM: NORMAL
KAPPA FREE LIGHT CHAINS QNT: 21.64 MG/L (ref 3.3–19.4)
KAPPA/LAMBDA FREE LIGHT CHAIN RATIO: 1.53 (ref 0.26–1.65)
LAMBDA FREE LIGHT CHAINS QNT: 14.12 MG/L (ref 5.71–26.3)

## 2022-02-24 LAB — BETA-2 MICROGLOBULIN: 1.3 MG/L (ref 0.6–2.4)

## 2022-02-25 ENCOUNTER — OFFICE VISIT (OUTPATIENT)
Dept: ONCOLOGY | Age: 72
End: 2022-02-25
Payer: MEDICARE

## 2022-02-25 VITALS
DIASTOLIC BLOOD PRESSURE: 81 MMHG | HEART RATE: 66 BPM | HEIGHT: 77 IN | WEIGHT: 189.7 LBS | TEMPERATURE: 97.1 F | OXYGEN SATURATION: 98 % | BODY MASS INDEX: 22.4 KG/M2 | SYSTOLIC BLOOD PRESSURE: 149 MMHG

## 2022-02-25 DIAGNOSIS — D47.2 MONOCLONAL GAMMOPATHY: Primary | ICD-10-CM

## 2022-02-25 PROCEDURE — 99212 OFFICE O/P EST SF 10 MIN: CPT

## 2022-02-25 NOTE — PROGRESS NOTES
[address]  [facility]   Hematology/Oncology  Consult      Patient Name: Micki Leung  YOB: 1950  PCP: Phoenix Raines DO   Referring Provider: [id number] / [place] [id number]     Reason for Consultation:   Chief Complaint   Patient presents with    Follow-up     MONOCLONAL GAMMOPATHY         Subjective:  BL hands numbness remains improved s/p surgical intervention. Following with derm for SCC's. No new issues in interim. No complaints today    History of Present Illness: This pt is a very pleasant 69 yo male who presents today in referral for evaluation of an abnormal SPEP which showed a M-spike of 0.3 done by his PCP. Review of his CBC's over the last 10 years show a completely normal cell count. He states that this work up began after he noticed numbness, tingling and difficulty using his hands after he went snowmobiling some time ago. He states he gets this sensation every time he snowmobiles, but at no other time including when he is out in the cold or skiing, and not when he is holding other vibrating tools such as his lawnmower or weeding machine. He otherwise has no complaints today including no bone or back pain, no fevers, chills, sweats, or abnormal weight loss. He has not had any HA, dizziness, lightheadedness or other neuropathy.     Diagnostic Data:     Past Medical History:   Diagnosis Date    Hypertension     x25 years    Mild mitral regurgitation     RBBB 7/7/2017       Patient Active Problem List    Diagnosis Date Noted    RBBB 07/07/2017    Essential hypertension, benign 05/19/2015    Tricuspid regurgitation 05/19/2015    Mild mitral regurgitation         Past Surgical History:   Procedure Laterality Date    APPENDECTOMY         Family History  Family History   Problem Relation Age of Onset    Heart Attack Mother 71        heart attack    Stroke Father 71        heart attack       Social History    TOBACCO:   reports that he has quit smoking. He has never used smokeless tobacco.  ETOH:   reports current alcohol use. Home Medications  Prior to Admission medications    Medication Sig Start Date End Date Taking? Authorizing Provider   Cholecalciferol (VITAMIN D3) 3000 units TABS Take by mouth   Yes Historical Provider, MD   atorvastatin (LIPITOR) 20 MG tablet Take 20 mg by mouth daily  2/21/19  Yes Historical Provider, MD   amLODIPine (NORVASC) 10 MG tablet Take 5 mg by mouth daily  2/21/19  Yes Historical Provider, MD   Benfotiamine 150 MG CAPS Take by mouth   Yes Historical Provider, MD   doxazosin (CARDURA) 4 MG tablet Take 4 mg by mouth nightly   Yes Historical Provider, MD   lisinopril (PRINIVIL;ZESTRIL) 20 MG tablet Take 20 mg by mouth daily. Yes Historical Provider, MD       Allergies  No Known Allergies    Review of Systems:    Constitutional:  No fever chills or rigors. Eyes: No changes in vision, discharge, or pain  ENT: No Headaches, hearing loss or vertigo. No mouth sores or sore throat. No change in taste or smell. Cardiovascular: No chest discomfort, dyspnea on exertion, palpitations or loss of consciousness. or phlebitis. Respiratory: Has no cough or wheezing, Has no sputum production. Has no hemoptysis, Has no pleuritic pain, . Gastrointestinal: No abdominal pain, appetite loss, blood in stools. No change in bowel habits. No hematemesis   Genitourinary: Patient acknowledges no dysuria, trouble voiding, or hematuria. No nocturia or increased frequency. Musculoskeletal: No gait disturbance, weakness or joint complaints. Integumentary: No rash or pruritis. Neurological: No headache, diplopia, change in muscle strength. + numbness or tingling ONLY w/ riding his snowmobile. No change in gait, balance, coordination, mood, affect, memory, mentation, behavior. Psychiatric: No anxiety, or depression. Endocrine: No temperature intolerance. No excessive thirst, fluid intake, or urination. No tremor. Hematologic/Lymphatic: No abnormal bruising or bleeding, blood clots or swollen lymph nodes. Allergic/Immunologic: No nasal congestion or hives. Objective  BP (!) 149/81   Pulse 66   Temp 97.1 °F (36.2 °C)   Ht 6' 5\" (1.956 m)   Wt 189 lb 11.2 oz (86 kg)   SpO2 98%   BMI 22.50 kg/m²     Physical Performance Status    Physical Exam:  General: AAO to person, place, time, and purpose, appears stated age, cooperative, no acute distress, pleasant   Head and neck : PERRLA, EOMI . Sclera non icteric. Oropharynx : Clear  Neck: no JVD,  no adenopathy  LYMPHATICS : No LAD  Lungs: Clear to auscultation   Heart: Regular rate and regular rhythm, no murmur, normal S1, S2  Abdomen: Soft, non-tender;no masses, no organomegaly  Extremities: No edema,no cyanosis, no clubbing  Skin:  No rash  Neurologic:Cranial nerves grossly intact.  No focal motor or sensory deficits    Recent Laboratory Data-   Lab Results   Component Value Date    WBC 6.7 02/21/2022    HGB 15.4 02/21/2022    HCT 47.1 02/21/2022    MCV 96.9 02/21/2022     02/21/2022    LYMPHOPCT 25.0 02/21/2022    RBC 4.86 02/21/2022    MCH 31.7 02/21/2022    MCHC 32.7 02/21/2022    RDW 12.4 02/21/2022    NEUTOPHILPCT 63.5 02/21/2022    MONOPCT 8.7 02/21/2022    BASOPCT 0.6 02/21/2022    NEUTROABS 4.23 02/21/2022    LYMPHSABS 1.67 02/21/2022    MONOSABS 0.58 02/21/2022    EOSABS 0.13 02/21/2022    BASOSABS 0.04 02/21/2022       Lab Results   Component Value Date     02/21/2022    K 4.3 02/21/2022     02/21/2022    CO2 29 02/21/2022    BUN 19 02/21/2022    CREATININE 0.8 02/21/2022    GLUCOSE 109 (H) 02/21/2022    CALCIUM 9.5 02/21/2022    PROT 7.0 02/21/2022    LABALBU 4.2 02/21/2022    LABALBU 3.6 02/21/2022    BILITOT 0.4 02/21/2022    ALKPHOS 64 02/21/2022    AST 26 02/21/2022    ALT 8 02/21/2022    LABGLOM >60 02/21/2022    GFRAA >60 02/21/2022       No results found for: IRON, TIBC, FERRITIN        Radiology-    No results found.      ASSESSMENT/PLAN :    Bernadette Chilel was seen today for follow-up. Diagnoses and all orders for this visit:    Monoclonal gammopathy    67 yo male  Abnormal M-spike of 0.3  HTN    - Will pursue work up to r/o MM  - Repeat SPEP, add BENI and UPEP  - Repeat CBC/CMP  - Ig's  - K/L FLC and FLCR  - B-2 microglobulin  - LDH  - Depending on above results, may need a BMBx. However there is a decent possibility this represents a MGUS  - RTC in 2 weeks to review results    7/23/19  - Ig's normal  - Beta-2 1.3  - SPEP w/ faint restriction in gamma region and normal polyclonal IG's. Spoke with pathology and this was an unusual result, possibly an IgD or IgE. K/L FLC of 2.18/1.29, with FLCR of 1.69 (very slightly elevated)  - CBC and CMP WNL  - LDH normal  - Above abnormalities are slight, and given his otherwise lack of symptoms (except when on snowmobile), will elect to proceed with observation at this time  - RTC in 3 months w/ SPEP w/ BENI, Ig's, UPEP and BENI, and K/L FLC    10/22/19  - Labs today pending  - Saw neurology and diagnosed with a cervical radiculopathy (via EMG) which is believed to be causing him numbness. He will send me the reports as he forgot to bring them in today  - RTC in 4 months with repeat labs    2/25/20  - Labs from 10/19 stable  - Cervical radiculopathy causing BL hand numbness with snowmobile activities. Going for surgery with NSG at [facility] in 4/20   - Cont to monitor MM panel, will move to q 6 months  - RTC in 6 months    8/25/20  - Labs from last week with stable CBC/CMP  - IgA slightly elevated at 442, IgG/M normal  - Beta 2 was 1.1  - FLCR WNL at 1.61  - SPEP without M-spike  - Surgery went well at [facility], almost no down time after, improved strength in L hand.  Anxious to try snowmobiling again    2/23/21  - CBC/CMP remain WNL  - SPEP w/o M-spike  - K/L FLCR WNL and stable  - Ig's WNL  - Continue observation alone at this time  - RTC in 6 months    8/24/21  - MGUS  - CBC/CMP remain WNL  - SPEP, K/L FLCR, Ig's all pending  - Continue observation alone at this time  - RTC in 6 months, labs week before    2/25/22  - MGUS  - CBC/CMP remain WNL  - B2, K/L FLCR, Ig's all stable from previous.  Previously, BENI only had detected the light chain, SPEP now with m-spike 0.3  - Continue observation alone at this time  - RTC in 6 months, labs week before      Afsaneh Vee MD   Electronically signed 2/25/2022 at 11:28 AM

## 2022-08-19 ENCOUNTER — HOSPITAL ENCOUNTER (OUTPATIENT)
Dept: INFUSION THERAPY | Age: 72
Discharge: HOME | End: 2022-08-19
Payer: MEDICARE

## 2022-08-19 DIAGNOSIS — D47.2 MONOCLONAL GAMMOPATHY: ICD-10-CM

## 2022-08-19 LAB
ALBUMIN SERPL-MCNC: 4.4 G/DL (ref 3.5–5.2)
ALP BLD-CCNC: 67 U/L (ref 40–129)
ALT SERPL-CCNC: 15 U/L (ref 0–40)
ANION GAP SERPL CALCULATED.3IONS-SCNC: 6 MMOL/L (ref 7–16)
AST SERPL-CCNC: 26 U/L (ref 0–39)
BASOPHILS ABSOLUTE: 0.02 E9/L (ref 0–0.2)
BASOPHILS RELATIVE PERCENT: 0.3 % (ref 0–2)
BILIRUB SERPL-MCNC: 0.3 MG/DL (ref 0–1.2)
BUN BLDV-MCNC: 11 MG/DL (ref 6–23)
CALCIUM SERPL-MCNC: 9.4 MG/DL (ref 8.6–10.2)
CHLORIDE BLD-SCNC: 105 MMOL/L (ref 98–107)
CO2: 30 MMOL/L (ref 22–29)
CREAT SERPL-MCNC: 0.7 MG/DL (ref 0.7–1.2)
EOSINOPHILS ABSOLUTE: 0.14 E9/L (ref 0.05–0.5)
EOSINOPHILS RELATIVE PERCENT: 2.2 % (ref 0–6)
GFR AFRICAN AMERICAN: >60
GFR NON-AFRICAN AMERICAN: >60 ML/MIN/1.73
GLUCOSE BLD-MCNC: 77 MG/DL (ref 74–99)
HCT VFR BLD CALC: 44.1 % (ref 37–54)
HEMOGLOBIN: 14.6 G/DL (ref 12.5–16.5)
IMMATURE GRANULOCYTES #: 0.04 E9/L
IMMATURE GRANULOCYTES %: 0.6 % (ref 0–5)
LYMPHOCYTES ABSOLUTE: 1.59 E9/L (ref 1.5–4)
LYMPHOCYTES RELATIVE PERCENT: 25.5 % (ref 20–42)
MCH RBC QN AUTO: 31.3 PG (ref 26–35)
MCHC RBC AUTO-ENTMCNC: 33.1 % (ref 32–34.5)
MCV RBC AUTO: 94.4 FL (ref 80–99.9)
MONOCYTES ABSOLUTE: 0.63 E9/L (ref 0.1–0.95)
MONOCYTES RELATIVE PERCENT: 10.1 % (ref 2–12)
NEUTROPHILS ABSOLUTE: 3.82 E9/L (ref 1.8–7.3)
NEUTROPHILS RELATIVE PERCENT: 61.3 % (ref 43–80)
PDW BLD-RTO: 12.5 FL (ref 11.5–15)
PLATELET # BLD: 310 E9/L (ref 130–450)
PMV BLD AUTO: 9.1 FL (ref 7–12)
POTASSIUM SERPL-SCNC: 4.4 MMOL/L (ref 3.5–5)
RBC # BLD: 4.67 E12/L (ref 3.8–5.8)
SODIUM BLD-SCNC: 141 MMOL/L (ref 132–146)
WBC # BLD: 6.2 E9/L (ref 4.5–11.5)

## 2022-08-19 PROCEDURE — 80053 COMPREHEN METABOLIC PANEL: CPT

## 2022-08-19 PROCEDURE — 85025 COMPLETE CBC W/AUTO DIFF WBC: CPT

## 2022-08-19 PROCEDURE — 84165 PROTEIN E-PHORESIS SERUM: CPT

## 2022-08-19 PROCEDURE — 86334 IMMUNOFIX E-PHORESIS SERUM: CPT

## 2022-08-19 PROCEDURE — 82232 ASSAY OF BETA-2 PROTEIN: CPT

## 2022-08-19 PROCEDURE — 83883 ASSAY NEPHELOMETRY NOT SPEC: CPT

## 2022-08-19 PROCEDURE — 36415 COLL VENOUS BLD VENIPUNCTURE: CPT

## 2022-08-19 PROCEDURE — 82784 ASSAY IGA/IGD/IGG/IGM EACH: CPT

## 2022-08-20 LAB
KAPPA FREE LIGHT CHAINS QNT: 23.5 MG/L (ref 3.3–19.4)
KAPPA/LAMBDA FREE LIGHT CHAIN RATIO: 1.14 (ref 0.26–1.65)
LAMBDA FREE LIGHT CHAINS QNT: 20.54 MG/L (ref 5.71–26.3)

## 2022-08-22 LAB — BETA-2 MICROGLOBULIN: 1.6 MG/L (ref 0.6–2.4)

## 2022-08-23 LAB
ALBUMIN SERPL-MCNC: 3.6 G/DL (ref 3.5–4.7)
ALPHA-1-GLOBULIN: 0.3 G/DL (ref 0.2–0.4)
ALPHA-2-GLOBULIN: 0.8 G/DL (ref 0.5–1)
BETA GLOBULIN: 1 G/DL (ref 0.8–1.3)
ELECTROPHORESIS: NORMAL
GAMMA GLOBULIN: 1.2 G/DL (ref 0.7–1.6)
IGA: 387 MG/DL (ref 70–400)
IGG: 958 MG/DL (ref 700–1600)
IGM: 79 MG/DL (ref 40–230)
IMMUNOFIXATION RESULT, SERUM: NORMAL
TOTAL PROTEIN: 6.8 G/DL (ref 6.4–8.3)

## 2022-08-26 ENCOUNTER — OFFICE VISIT (OUTPATIENT)
Dept: ONCOLOGY | Age: 72
End: 2022-08-26
Payer: MEDICARE

## 2022-08-26 VITALS
OXYGEN SATURATION: 98 % | HEART RATE: 67 BPM | BODY MASS INDEX: 21.04 KG/M2 | TEMPERATURE: 98.2 F | HEIGHT: 77 IN | SYSTOLIC BLOOD PRESSURE: 126 MMHG | DIASTOLIC BLOOD PRESSURE: 78 MMHG | WEIGHT: 178.2 LBS

## 2022-08-26 DIAGNOSIS — D47.2 MONOCLONAL GAMMOPATHY: Primary | ICD-10-CM

## 2022-08-26 PROCEDURE — 99212 OFFICE O/P EST SF 10 MIN: CPT

## 2022-08-26 NOTE — PROGRESS NOTES
[address]  [facility]   Hematology/Oncology  Consult      Patient Name: Nicolás Dickson  YOB: 1950  PCP: Marylene Check, DO   Referring Provider: [id number] / [place] [id number]     Reason for Consultation:   Chief Complaint   Patient presents with    Follow-up     MONOCLONAL GAMMOPATHY        Subjective:  BL hands numbness remains improved (s/p surgical intervention). Following with derm for SCC's. Feels excellent today and in interim, no new issues reported    History of Present Illness: This pt is a very pleasant 69 yo male who presents today in referral for evaluation of an abnormal SPEP which showed a M-spike of 0.3 done by his PCP. Review of his CBC's over the last 10 years show a completely normal cell count. He states that this work up began after he noticed numbness, tingling and difficulty using his hands after he went snowmobiling some time ago. He states he gets this sensation every time he snowmobiles, but at no other time including when he is out in the cold or skiing, and not when he is holding other vibrating tools such as his lawnmower or weeding machine. He otherwise has no complaints today including no bone or back pain, no fevers, chills, sweats, or abnormal weight loss. He has not had any HA, dizziness, lightheadedness or other neuropathy.     Diagnostic Data:     Past Medical History:   Diagnosis Date    Hypertension     x25 years    Mild mitral regurgitation     RBBB 7/7/2017       Patient Active Problem List    Diagnosis Date Noted    RBBB 07/07/2017    Essential hypertension, benign 05/19/2015    Tricuspid regurgitation 05/19/2015    Mild mitral regurgitation         Past Surgical History:   Procedure Laterality Date    APPENDECTOMY         Family History  Family History   Problem Relation Age of Onset    Heart Attack Mother 71        heart attack    Stroke Father 71        heart attack       Social History    TOBACCO:   reports that he has quit smoking. He has never used smokeless tobacco.  ETOH:   reports current alcohol use. Home Medications  Prior to Admission medications    Medication Sig Start Date End Date Taking? Authorizing Provider   Cholecalciferol (VITAMIN D3) 3000 units TABS Take by mouth   Yes Historical Provider, MD   atorvastatin (LIPITOR) 20 MG tablet Take 20 mg by mouth daily  2/21/19  Yes Historical Provider, MD   amLODIPine (NORVASC) 10 MG tablet Take 5 mg by mouth daily  2/21/19  Yes Historical Provider, MD   Benfotiamine 150 MG CAPS Take by mouth   Yes Historical Provider, MD   doxazosin (CARDURA) 4 MG tablet Take 4 mg by mouth nightly   Yes Historical Provider, MD   lisinopril (PRINIVIL;ZESTRIL) 20 MG tablet Take 20 mg by mouth daily. Yes Historical Provider, MD       Allergies  No Known Allergies    Review of Systems:    Constitutional:  No fever chills or rigors. Eyes: No changes in vision, discharge, or pain  ENT: No Headaches, hearing loss or vertigo. No mouth sores or sore throat. No change in taste or smell. Cardiovascular: No chest discomfort, dyspnea on exertion, palpitations or loss of consciousness. or phlebitis. Respiratory: Has no cough or wheezing, Has no sputum production. Has no hemoptysis, Has no pleuritic pain, . Gastrointestinal: No abdominal pain, appetite loss, blood in stools. No change in bowel habits. No hematemesis   Genitourinary: Patient acknowledges no dysuria, trouble voiding, or hematuria. No nocturia or increased frequency. Musculoskeletal: No gait disturbance, weakness or joint complaints. Integumentary: No rash or pruritis. Neurological: No headache, diplopia, change in muscle strength. + numbness or tingling ONLY w/ riding his snowmobile. No change in gait, balance, coordination, mood, affect, memory, mentation, behavior. Psychiatric: No anxiety, or depression. Endocrine: No temperature intolerance. No excessive thirst, fluid intake, or urination. No tremor. Hematologic/Lymphatic: No abnormal bruising or bleeding, blood clots or swollen lymph nodes. Allergic/Immunologic: No nasal congestion or hives. Objective  /78   Pulse 67   Temp 98.2 °F (36.8 °C)   Ht 6' 5\" (1.956 m)   Wt 178 lb 3.2 oz (80.8 kg)   SpO2 98%   BMI 21.13 kg/m²     Physical Performance Status    Physical Exam:  General: AAO to person, place, time, and purpose, appears stated age, cooperative, no acute distress, pleasant   Head and neck : PERRLA, EOMI . Sclera non icteric. Oropharynx : Clear  Neck: no JVD,  no adenopathy  LYMPHATICS : No LAD  Lungs: Clear to auscultation   Heart: Regular rate and regular rhythm, no murmur, normal S1, S2  Abdomen: Soft, non-tender;no masses, no organomegaly  Extremities: No edema,no cyanosis, no clubbing  Skin:  No rash  Neurologic:Cranial nerves grossly intact.  No focal motor or sensory deficits    Recent Laboratory Data-   Lab Results   Component Value Date    WBC 6.2 08/19/2022    HGB 14.6 08/19/2022    HCT 44.1 08/19/2022    MCV 94.4 08/19/2022     08/19/2022    LYMPHOPCT 25.5 08/19/2022    RBC 4.67 08/19/2022    MCH 31.3 08/19/2022    MCHC 33.1 08/19/2022    RDW 12.5 08/19/2022    NEUTOPHILPCT 61.3 08/19/2022    MONOPCT 10.1 08/19/2022    BASOPCT 0.3 08/19/2022    NEUTROABS 3.82 08/19/2022    LYMPHSABS 1.59 08/19/2022    MONOSABS 0.63 08/19/2022    EOSABS 0.14 08/19/2022    BASOSABS 0.02 08/19/2022       Lab Results   Component Value Date     08/19/2022    K 4.4 08/19/2022     08/19/2022    CO2 30 (H) 08/19/2022    BUN 11 08/19/2022    CREATININE 0.7 08/19/2022    GLUCOSE 77 08/19/2022    CALCIUM 9.4 08/19/2022    PROT 6.8 08/19/2022    LABALBU 3.6 08/19/2022    LABALBU 4.4 08/19/2022    BILITOT 0.3 08/19/2022    ALKPHOS 67 08/19/2022    AST 26 08/19/2022    ALT 15 08/19/2022    LABGLOM >60 08/19/2022    GFRAA >60 08/19/2022       No results found for: IRON, TIBC, FERRITIN        Radiology-    No results found.      ASSESSMENT/PLAN :    [facility] was [facility] today for follow-up. Diagnoses and all orders for this visit:    Monoclonal gammopathy  67 yo male  Abnormal M-spike of 0.3  HTN    - Will pursue work up to r/o MM  - Repeat SPEP, add BENI and UPEP  - Repeat CBC/CMP  - Ig's  - K/L FLC and FLCR  - B-2 microglobulin  - LDH  - Depending on above results, may need a BMBx. However there is a decent possibility this represents a MGUS  - RTC in 2 weeks to review results    7/23/19  - Ig's normal  - Beta-2 1.3  - SPEP w/ faint restriction in gamma region and normal polyclonal IG's. Spoke with pathology and this was an unusual result, possibly an IgD or IgE. K/L FLC of 2.18/1.29, with FLCR of 1.69 (very slightly elevated)  - CBC and CMP WNL  - LDH normal  - Above abnormalities are slight, and given his otherwise lack of symptoms (except when on snowmobile), will elect to proceed with observation at this time  - RTC in 3 months w/ SPEP w/ BENI, Ig's, UPEP and BENI, and K/L FLC    10/22/19  - Labs today pending  - Saw neurology and diagnosed with a cervical radiculopathy (via EMG) which is believed to be causing him numbness. He will send me the reports as he forgot to bring them in today  - RTC in 4 months with repeat labs    2/25/20  - Labs from 10/19 stable  - Cervical radiculopathy causing BL hand numbness with snowmobile activities. Going for surgery with NSG at [facility] in 4/20   - Cont to monitor MM panel, will move to q 6 months  - RTC in 6 months    8/25/20  - Labs from last week with stable CBC/CMP  - IgA slightly elevated at 442, IgG/M normal  - Beta 2 was 1.1  - FLCR WNL at 1.61  - SPEP without M-spike  - Surgery went well at [facility], almost no down time after, improved strength in L hand.  Anxious to try snowmobiling again    2/23/21  - CBC/CMP remain WNL  - SPEP w/o M-spike  - K/L FLCR WNL and stable  - Ig's WNL  - Continue observation alone at this time  - RTC in 6 months    8/24/21  - MGUS  - CBC/CMP remain WNL  - SPEP, K/L FLCR, Ig's all pending  - Continue observation alone at this time  - RTC in 6 months, labs week before    2/25/22  - MGUS  - CBC/CMP remain WNL  - B2, K/L FLCR, Ig's all stable from previous. Previously, BENI only had detected the light chain, SPEP now with m-spike 0.3  - Continue observation alone at this time  - RTC in 6 months, labs week before    8/26/22  - MGUS  - CBC/CMP remain WNL  - B2, K/L FLCR, Ig's all stable from previous.  SPEP with m-spike 0.2  - Continue observation   - RTC in 6 months, labs week before    Bernie Fox MD   Electronically signed 8/26/2022 at 10:59 AM

## 2023-02-17 ENCOUNTER — HOSPITAL ENCOUNTER (OUTPATIENT)
Dept: INFUSION THERAPY | Age: 73
Discharge: HOME | End: 2023-02-17
Payer: MEDICARE

## 2023-02-17 DIAGNOSIS — D47.2 MONOCLONAL GAMMOPATHY: ICD-10-CM

## 2023-02-17 LAB
ALBUMIN SERPL-MCNC: 4.4 G/DL (ref 3.5–5.2)
ALP BLD-CCNC: 66 U/L (ref 40–129)
ALT SERPL-CCNC: 12 U/L (ref 0–40)
ANION GAP SERPL CALCULATED.3IONS-SCNC: 7 MMOL/L (ref 7–16)
AST SERPL-CCNC: 36 U/L (ref 0–39)
BASOPHILS ABSOLUTE: 0.04 E9/L (ref 0–0.2)
BASOPHILS RELATIVE PERCENT: 0.7 % (ref 0–2)
BILIRUB SERPL-MCNC: 0.5 MG/DL (ref 0–1.2)
BUN BLDV-MCNC: 14 MG/DL (ref 6–23)
CALCIUM SERPL-MCNC: 9.4 MG/DL (ref 8.6–10.2)
CHLORIDE BLD-SCNC: 101 MMOL/L (ref 98–107)
CO2: 30 MMOL/L (ref 22–29)
CREAT SERPL-MCNC: 0.7 MG/DL (ref 0.7–1.2)
EOSINOPHILS ABSOLUTE: 0.1 E9/L (ref 0.05–0.5)
EOSINOPHILS RELATIVE PERCENT: 1.6 % (ref 0–6)
GFR SERPL CREATININE-BSD FRML MDRD: >60 ML/MIN/1.73
GLUCOSE BLD-MCNC: 98 MG/DL (ref 74–99)
HCT VFR BLD CALC: 48.5 % (ref 37–54)
HEMOGLOBIN: 15.7 G/DL (ref 12.5–16.5)
IMMATURE GRANULOCYTES #: 0.02 E9/L
IMMATURE GRANULOCYTES %: 0.3 % (ref 0–5)
LYMPHOCYTES ABSOLUTE: 1.57 E9/L (ref 1.5–4)
LYMPHOCYTES RELATIVE PERCENT: 25.6 % (ref 20–42)
MCH RBC QN AUTO: 30.8 PG (ref 26–35)
MCHC RBC AUTO-ENTMCNC: 32.4 % (ref 32–34.5)
MCV RBC AUTO: 95.3 FL (ref 80–99.9)
MONOCYTES ABSOLUTE: 0.49 E9/L (ref 0.1–0.95)
MONOCYTES RELATIVE PERCENT: 8 % (ref 2–12)
NEUTROPHILS ABSOLUTE: 3.92 E9/L (ref 1.8–7.3)
NEUTROPHILS RELATIVE PERCENT: 63.8 % (ref 43–80)
PDW BLD-RTO: 12.6 FL (ref 11.5–15)
PLATELET # BLD: 291 E9/L (ref 130–450)
PMV BLD AUTO: 9.4 FL (ref 7–12)
POTASSIUM SERPL-SCNC: 4.5 MMOL/L (ref 3.5–5)
RBC # BLD: 5.09 E12/L (ref 3.8–5.8)
SODIUM BLD-SCNC: 138 MMOL/L (ref 132–146)
WBC # BLD: 6.1 E9/L (ref 4.5–11.5)

## 2023-02-17 PROCEDURE — 82784 ASSAY IGA/IGD/IGG/IGM EACH: CPT

## 2023-02-17 PROCEDURE — 84165 PROTEIN E-PHORESIS SERUM: CPT

## 2023-02-17 PROCEDURE — 80053 COMPREHEN METABOLIC PANEL: CPT

## 2023-02-17 PROCEDURE — 82232 ASSAY OF BETA-2 PROTEIN: CPT

## 2023-02-17 PROCEDURE — 83883 ASSAY NEPHELOMETRY NOT SPEC: CPT

## 2023-02-17 PROCEDURE — 85025 COMPLETE CBC W/AUTO DIFF WBC: CPT

## 2023-02-17 PROCEDURE — 86334 IMMUNOFIX E-PHORESIS SERUM: CPT

## 2023-02-17 PROCEDURE — 36415 COLL VENOUS BLD VENIPUNCTURE: CPT

## 2023-02-18 LAB
KAPPA FREE LIGHT CHAINS QNT: 27.17 MG/L (ref 3.3–19.4)
KAPPA/LAMBDA FREE LIGHT CHAIN RATIO: 1.58 (ref 0.26–1.65)
LAMBDA FREE LIGHT CHAINS QNT: 17.16 MG/L (ref 5.71–26.3)

## 2023-02-20 LAB
IGA: 383 MG/DL (ref 70–400)
IGG: 983 MG/DL (ref 700–1600)
IGM: 77 MG/DL (ref 40–230)

## 2023-02-21 LAB
ALBUMIN SERPL-MCNC: 3.2 G/DL (ref 3.5–4.7)
ALPHA-1-GLOBULIN: 0.3 G/DL (ref 0.2–0.4)
ALPHA-2-GLOBULIN: 0.8 G/DL (ref 0.5–1)
BETA GLOBULIN: 1.2 G/DL (ref 0.8–1.3)
ELECTROPHORESIS: ABNORMAL
GAMMA GLOBULIN: 1.4 G/DL (ref 0.7–1.6)
IMMUNOFIXATION RESULT, SERUM: NORMAL
TOTAL PROTEIN: 7 G/DL (ref 6.4–8.3)

## 2023-02-23 LAB — BETA-2 MICROGLOBULIN: 1.6 MG/L (ref 0.6–2.4)

## 2023-02-24 ENCOUNTER — OFFICE VISIT (OUTPATIENT)
Dept: ONCOLOGY | Age: 73
End: 2023-02-24

## 2023-02-24 VITALS
TEMPERATURE: 97.3 F | DIASTOLIC BLOOD PRESSURE: 81 MMHG | HEART RATE: 58 BPM | BODY MASS INDEX: 21.68 KG/M2 | OXYGEN SATURATION: 100 % | SYSTOLIC BLOOD PRESSURE: 130 MMHG | WEIGHT: 183.6 LBS | HEIGHT: 77 IN

## 2023-02-24 DIAGNOSIS — D47.2 MONOCLONAL GAMMOPATHY: Primary | ICD-10-CM

## 2023-02-24 NOTE — PROGRESS NOTES
[address] [facility] [address]  [address]   Hematology/Oncology  Consult      Patient Name: Alan Olea  YOB: 1950  PCP: Luz Baldwin DO   Referring Provider: [address] [place] [address] / [facility] [id number]     Reason for Consultation:   Chief Complaint   Patient presents with    Cancer    Follow-up     MGUS        Subjective:  BL hands numbness remains improved (s/p surgical intervention). Following with derm for SCC's. Feels excellent today with no acute complaints    History of Present Illness: This pt is a very pleasant 69 yo male who presents today in referral for evaluation of an abnormal SPEP which showed a M-spike of 0.3 done by his PCP. Review of his CBC's over the last 10 years show a completely normal cell count. He states that this work up began after he noticed numbness, tingling and difficulty using his hands after he went snowmobiling some time ago. He states he gets this sensation every time he snowmobiles, but at no other time including when he is out in the cold or skiing, and not when he is holding other vibrating tools such as his lawnmower or weeding machine. He otherwise has no complaints today including no bone or back pain, no fevers, chills, sweats, or abnormal weight loss. He has not had any HA, dizziness, lightheadedness or other neuropathy. Diagnostic Data:     Past Medical History:   Diagnosis Date    Hypertension     x25 years    Mild mitral regurgitation     RBBB 7/7/2017       Patient Active Problem List    Diagnosis Date Noted    RBBB 07/07/2017    Essential hypertension, benign 05/19/2015    Tricuspid regurgitation 05/19/2015    Mild mitral regurgitation         Past Surgical History:   Procedure Laterality Date    APPENDECTOMY         Family History  Family History   Problem Relation Age of Onset    Heart Attack Mother 71        heart attack    Stroke Father 71        heart attack       Social History    TOBACCO:   reports that he has quit smoking. He has never used smokeless tobacco.  ETOH:   reports current alcohol use. Home Medications  Prior to Admission medications    Medication Sig Start Date End Date Taking? Authorizing Provider   Cholecalciferol (VITAMIN D3) 3000 units TABS Take by mouth   Yes Historical Provider, MD   atorvastatin (LIPITOR) 20 MG tablet Take 20 mg by mouth daily  2/21/19  Yes Historical Provider, MD   amLODIPine (NORVASC) 10 MG tablet Take 5 mg by mouth daily  2/21/19  Yes Historical Provider, MD   Benfotiamine 150 MG CAPS Take by mouth   Yes Historical Provider, MD   doxazosin (CARDURA) 4 MG tablet Take 4 mg by mouth nightly   Yes Historical Provider, MD   lisinopril (PRINIVIL;ZESTRIL) 20 MG tablet Take 20 mg by mouth daily. Yes Historical Provider, MD       Allergies  No Known Allergies    Review of Systems:    Constitutional:  No fever chills or rigors. Eyes: No changes in vision, discharge, or pain  ENT: No Headaches, hearing loss or vertigo. No mouth sores or sore throat. No change in taste or smell. Cardiovascular: No chest discomfort, dyspnea on exertion, palpitations or loss of consciousness. or phlebitis. Respiratory: Has no cough or wheezing, Has no sputum production. Has no hemoptysis, Has no pleuritic pain, . Gastrointestinal: No abdominal pain, appetite loss, blood in stools. No change in bowel habits. No hematemesis   Genitourinary: Patient acknowledges no dysuria, trouble voiding, or hematuria. No nocturia or increased frequency. Musculoskeletal: No gait disturbance, weakness or joint complaints. Integumentary: No rash or pruritis. Neurological: No headache, diplopia, change in muscle strength. + numbness or tingling ONLY w/ riding his snowmobile. No change in gait, balance, coordination, mood, affect, memory, mentation, behavior. Psychiatric: No anxiety, or depression. Endocrine: No temperature intolerance. No excessive thirst, fluid intake, or urination. No tremor.    Hematologic/Lymphatic: No abnormal bruising or bleeding, blood clots or swollen lymph nodes. Allergic/Immunologic: No nasal congestion or hives. Objective  /81   Pulse 58   Temp 97.3 °F (36.3 °C)   Ht 6' 5\" (1.956 m)   Wt 183 lb 9.6 oz (83.3 kg)   SpO2 100%   BMI 21.77 kg/m²     Physical Performance Status    Physical Exam:  General: AAO to person, place, time, and purpose, appears stated age, cooperative, no acute distress, pleasant   Head and neck : PERRLA, EOMI . Sclera non icteric. Oropharynx : Clear  Neck: no JVD,  no adenopathy  LYMPHATICS : No LAD  Lungs: Clear to auscultation   Heart: Regular rate and regular rhythm, no murmur, normal S1, S2  Abdomen: Soft, non-tender;no masses, no organomegaly  Extremities: No edema,no cyanosis, no clubbing  Skin:  No rash  Neurologic:Cranial nerves grossly intact. No focal motor or sensory deficits    Recent Laboratory Data-   Lab Results   Component Value Date    WBC 6.1 02/17/2023    HGB 15.7 02/17/2023    HCT 48.5 02/17/2023    MCV 95.3 02/17/2023     02/17/2023    LYMPHOPCT 25.6 02/17/2023    RBC 5.09 02/17/2023    MCH 30.8 02/17/2023    MCHC 32.4 02/17/2023    RDW 12.6 02/17/2023    NEUTOPHILPCT 63.8 02/17/2023    MONOPCT 8.0 02/17/2023    BASOPCT 0.7 02/17/2023    NEUTROABS 3.92 02/17/2023    LYMPHSABS 1.57 02/17/2023    MONOSABS 0.49 02/17/2023    EOSABS 0.10 02/17/2023    BASOSABS 0.04 02/17/2023       Lab Results   Component Value Date     02/17/2023    K 4.5 02/17/2023     02/17/2023    CO2 30 (H) 02/17/2023    BUN 14 02/17/2023    CREATININE 0.7 02/17/2023    GLUCOSE 98 02/17/2023    CALCIUM 9.4 02/17/2023    PROT 7.0 02/17/2023    LABALBU 3.2 (L) 02/17/2023    LABALBU 4.4 02/17/2023    BILITOT 0.5 02/17/2023    ALKPHOS 66 02/17/2023    AST 36 02/17/2023    ALT 12 02/17/2023    LABGLOM >60 02/17/2023    GFRAA >60 08/19/2022       No results found for: IRON, TIBC, FERRITIN        Radiology-    No results found.       ASSESSMENT/PLAN :    Tory Race was seen today for cancer and follow-up. Diagnoses and all orders for this visit:    Monoclonal gammopathy  69 yo male  Abnormal M-spike of 0.3  HTN    - Will pursue work up to r/o MM  - Repeat SPEP, add BENI and UPEP  - Repeat CBC/CMP  - Ig's  - K/L FLC and FLCR  - B-2 microglobulin  - LDH  - Depending on above results, may need a BMBx. However there is a decent possibility this represents a MGUS  - RTC in 2 weeks to review results    7/23/19  - Ig's normal  - Beta-2 1.3  - SPEP w/ faint restriction in gamma region and normal polyclonal IG's. Spoke with pathology and this was an unusual result, possibly an IgD or IgE. K/L FLC of 2.18/1.29, with FLCR of 1.69 (very slightly elevated)  - CBC and CMP WNL  - LDH normal  - Above abnormalities are slight, and given his otherwise lack of symptoms (except when on snowmobile), will elect to proceed with observation at this time  - RTC in 3 months w/ SPEP w/ BENI, Ig's, UPEP and BENI, and K/L FLC    10/22/19  - Labs today pending  - Saw neurology and diagnosed with a cervical radiculopathy (via EMG) which is believed to be causing him numbness. He will send me the reports as he forgot to bring them in today  - RTC in 4 months with repeat labs    2/25/20  - Labs from 10/19 stable  - Cervical radiculopathy causing BL hand numbness with snowmobile activities. Going for surgery with NSG at [facility] in 4/20   - Cont to monitor MM panel, will move to q 6 months  - RTC in 6 months    8/25/20  - Labs from last week with stable CBC/CMP  - IgA slightly elevated at 442, IgG/M normal  - Beta 2 was 1.1  - FLCR WNL at 1.61  - SPEP without M-spike  - Surgery went well at [facility], almost no down time after, improved strength in L hand.  Anxious to try snowmobiling again    2/23/21  - CBC/CMP remain WNL  - SPEP w/o M-spike  - K/L FLCR WNL and stable  - Ig's WNL  - Continue observation alone at this time  - RTC in 6 months    8/24/21  - MGUS  - CBC/CMP remain WNL  - SPEP, K/L FLCR, Ig's all pending  - Continue observation alone at this time  - RTC in 6 months, labs week before    2/25/22  - MGUS  - CBC/CMP remain WNL  - B2, K/L FLCR, Ig's all stable from previous. Previously, BENI only had detected the light chain, SPEP now with m-spike 0.3  - Continue observation alone at this time  - RTC in 6 months, labs week before    8/26/22  - MGUS  - CBC/CMP remain WNL  - B2, K/L FLCR, Ig's all stable from previous. SPEP with m-spike 0.2  - Continue observation   - RTC in 6 months, labs week before    2/24/23  - MGUS  - CBC/CMP remain stable and essentially WNL  - B2, K/L FLCR, Ig's all stable from previous.  SPEP with m-spike no longer detected  - Continue observation   - RTC in 6 months, labs week before    Asif Doshi MD   Electronically signed 2/24/2023 at 11:21 AM

## 2023-08-18 ENCOUNTER — HOSPITAL ENCOUNTER (OUTPATIENT)
Dept: INFUSION THERAPY | Age: 73
Discharge: HOME | End: 2023-08-18
Payer: MEDICARE

## 2023-08-18 DIAGNOSIS — D47.2 MONOCLONAL GAMMOPATHY: ICD-10-CM

## 2023-08-18 LAB
ALBUMIN SERPL-MCNC: 4.3 G/DL (ref 3.5–5.2)
ALP SERPL-CCNC: 63 U/L (ref 40–129)
ALT SERPL-CCNC: 10 U/L (ref 0–40)
ANION GAP SERPL CALCULATED.3IONS-SCNC: 10 MMOL/L (ref 7–16)
AST SERPL-CCNC: 26 U/L (ref 0–39)
BASOPHILS # BLD: 0.03 K/UL (ref 0–0.2)
BASOPHILS NFR BLD: 1 % (ref 0–2)
BILIRUB SERPL-MCNC: 0.6 MG/DL (ref 0–1.2)
BUN SERPL-MCNC: 12 MG/DL (ref 6–23)
CALCIUM SERPL-MCNC: 9.7 MG/DL (ref 8.6–10.2)
CHLORIDE SERPL-SCNC: 102 MMOL/L (ref 98–107)
CO2 SERPL-SCNC: 28 MMOL/L (ref 22–29)
CREAT SERPL-MCNC: 0.8 MG/DL (ref 0.7–1.2)
EOSINOPHIL # BLD: 0.09 K/UL (ref 0.05–0.5)
EOSINOPHILS RELATIVE PERCENT: 1 % (ref 0–6)
ERYTHROCYTE [DISTWIDTH] IN BLOOD BY AUTOMATED COUNT: 12.4 % (ref 11.5–15)
GFR SERPL CREATININE-BSD FRML MDRD: >60 ML/MIN/1.73M2
GLUCOSE SERPL-MCNC: 138 MG/DL (ref 74–99)
HCT VFR BLD AUTO: 42.4 % (ref 37–54)
HGB BLD-MCNC: 14.5 G/DL (ref 12.5–16.5)
IGA SERPL-MCNC: 365 MG/DL (ref 70–400)
IGG SERPL-MCNC: 931 MG/DL (ref 700–1600)
IGM SERPL-MCNC: 74 MG/DL (ref 40–230)
IMM GRANULOCYTES # BLD AUTO: <0.03 K/UL (ref 0–0.58)
IMM GRANULOCYTES NFR BLD: 0 % (ref 0–5)
LYMPHOCYTES NFR BLD: 1.39 K/UL (ref 1.5–4)
LYMPHOCYTES RELATIVE PERCENT: 21 % (ref 20–42)
MCH RBC QN AUTO: 32.2 PG (ref 26–35)
MCHC RBC AUTO-ENTMCNC: 34.2 G/DL (ref 32–34.5)
MCV RBC AUTO: 94.2 FL (ref 80–99.9)
MONOCYTES NFR BLD: 0.47 K/UL (ref 0.1–0.95)
MONOCYTES NFR BLD: 7 % (ref 2–12)
NEUTROPHILS NFR BLD: 70 % (ref 43–80)
NEUTS SEG NFR BLD: 4.6 K/UL (ref 1.8–7.3)
PLATELET # BLD AUTO: 229 K/UL (ref 130–450)
PMV BLD AUTO: 9.1 FL (ref 7–12)
POTASSIUM SERPL-SCNC: 4.5 MMOL/L (ref 3.5–5)
PROT SERPL-MCNC: 7.3 G/DL (ref 6.4–8.3)
RBC # BLD AUTO: 4.5 M/UL (ref 3.8–5.8)
SODIUM SERPL-SCNC: 140 MMOL/L (ref 132–146)
WBC OTHER # BLD: 6.6 K/UL (ref 4.5–11.5)

## 2023-08-18 PROCEDURE — 84165 PROTEIN E-PHORESIS SERUM: CPT

## 2023-08-18 PROCEDURE — 84155 ASSAY OF PROTEIN SERUM: CPT

## 2023-08-18 PROCEDURE — 85025 COMPLETE CBC W/AUTO DIFF WBC: CPT

## 2023-08-18 PROCEDURE — 80053 COMPREHEN METABOLIC PANEL: CPT

## 2023-08-18 PROCEDURE — 82784 ASSAY IGA/IGD/IGG/IGM EACH: CPT

## 2023-08-18 PROCEDURE — 83521 IG LIGHT CHAINS FREE EACH: CPT

## 2023-08-18 PROCEDURE — 86334 IMMUNOFIX E-PHORESIS SERUM: CPT

## 2023-08-18 PROCEDURE — 82232 ASSAY OF BETA-2 PROTEIN: CPT

## 2023-08-18 PROCEDURE — 36415 COLL VENOUS BLD VENIPUNCTURE: CPT

## 2023-08-19 LAB
FREE KAPPA/LAMBDA RATIO: 1.36 (ref 0.26–1.65)
KAPPA LC FREE SER-MCNC: 22.1 MG/L (ref 3.7–19.4)
LAMBDA LC FREE SERPL-MCNC: 16.2 MG/L (ref 5.7–26.3)

## 2023-08-21 LAB
ALBUMIN SERPL-MCNC: 2.9 G/DL (ref 3.5–4.7)
ALPHA1 GLOB SERPL ELPH-MCNC: 0.3 G/DL (ref 0.2–0.4)
ALPHA2 GLOB SERPL ELPH-MCNC: 0.9 G/DL (ref 0.5–1)
B-GLOBULIN SERPL ELPH-MCNC: 1.3 G/DL (ref 0.8–1.3)
B2 MICROGLOB SERPL IA-MCNC: 1.5 MG/L (ref 0.6–2.4)
GAMMA GLOB SERPL ELPH-MCNC: 1.5 G/DL (ref 0.7–1.6)
INTERPRETATION SERPL IFE-IMP: NORMAL
M PROTEIN SERPL ELPH-MCNC: 0.3 G/DL
PATH REV: NORMAL
PATHOLOGIST: ABNORMAL
PROT PATTERN SERPL ELPH-IMP: ABNORMAL
PROT SERPL-MCNC: 7 G/DL (ref 6.4–8.3)

## 2023-08-25 ENCOUNTER — OFFICE VISIT (OUTPATIENT)
Dept: ONCOLOGY | Age: 73
End: 2023-08-25

## 2023-08-25 VITALS
BODY MASS INDEX: 20.75 KG/M2 | OXYGEN SATURATION: 100 % | SYSTOLIC BLOOD PRESSURE: 132 MMHG | WEIGHT: 175.7 LBS | DIASTOLIC BLOOD PRESSURE: 72 MMHG | TEMPERATURE: 97.5 F | HEART RATE: 64 BPM | HEIGHT: 77 IN

## 2023-08-25 DIAGNOSIS — D47.2 MONOCLONAL GAMMOPATHY: Primary | ICD-10-CM

## 2023-11-28 ENCOUNTER — LAB REQUISITION (OUTPATIENT)
Dept: LAB | Facility: HOSPITAL | Age: 73
End: 2023-11-28
Payer: MEDICARE

## 2023-11-28 DIAGNOSIS — L98.9 DISORDER OF THE SKIN AND SUBCUTANEOUS TISSUE, UNSPECIFIED: ICD-10-CM

## 2023-11-28 PROCEDURE — 88321 CONSLTJ&REPRT SLD PREP ELSWR: CPT | Performed by: DERMATOLOGY

## 2023-11-28 PROCEDURE — 88342 IMHCHEM/IMCYTCHM 1ST ANTB: CPT | Performed by: DERMATOLOGY

## 2023-11-28 PROCEDURE — 88321 CONSLTJ&REPRT SLD PREP ELSWR: CPT | Mod: TC,SUR,OUT | Performed by: DERMATOLOGY

## 2023-11-28 PROCEDURE — 88341 IMHCHEM/IMCYTCHM EA ADD ANTB: CPT | Performed by: DERMATOLOGY

## 2023-12-28 LAB
LAB AP ASR DISCLAIMER: NORMAL
PATH REPORT.FINAL DX SPEC: NORMAL
PATH REPORT.GROSS SPEC: NORMAL
PATH REPORT.RELEVANT HX SPEC: NORMAL
PATH REPORT.TOTAL CANCER: NORMAL

## 2024-01-18 ENCOUNTER — LAB REQUISITION (OUTPATIENT)
Dept: LAB | Facility: HOSPITAL | Age: 74
End: 2024-01-18
Payer: MEDICARE

## 2024-01-31 ENCOUNTER — LAB REQUISITION (OUTPATIENT)
Dept: DERMATOPATHOLOGY | Facility: CLINIC | Age: 74
End: 2024-01-31
Payer: MEDICARE

## 2024-01-31 DIAGNOSIS — L98.9 DISORDER OF THE SKIN AND SUBCUTANEOUS TISSUE, UNSPECIFIED: ICD-10-CM

## 2024-01-31 PROCEDURE — 88312 SPECIAL STAINS GROUP 1: CPT | Performed by: DERMATOLOGY

## 2024-01-31 PROCEDURE — 88341 IMHCHEM/IMCYTCHM EA ADD ANTB: CPT | Performed by: DERMATOLOGY

## 2024-01-31 PROCEDURE — 88321 CONSLTJ&REPRT SLD PREP ELSWR: CPT | Performed by: DERMATOLOGY

## 2024-01-31 PROCEDURE — 88342 IMHCHEM/IMCYTCHM 1ST ANTB: CPT | Performed by: DERMATOLOGY

## 2024-02-16 ENCOUNTER — HOSPITAL ENCOUNTER (OUTPATIENT)
Dept: INFUSION THERAPY | Age: 74
Discharge: HOME | End: 2024-02-16
Payer: MEDICARE

## 2024-02-16 DIAGNOSIS — D47.2 MONOCLONAL GAMMOPATHY: ICD-10-CM

## 2024-02-16 LAB
ALBUMIN SERPL-MCNC: 4.3 G/DL (ref 3.5–5.2)
ALP SERPL-CCNC: 71 U/L (ref 40–129)
ALT SERPL-CCNC: 10 U/L (ref 0–40)
ANION GAP SERPL CALCULATED.3IONS-SCNC: 10 MMOL/L (ref 7–16)
AST SERPL-CCNC: 24 U/L (ref 0–39)
BASOPHILS # BLD: 0.02 K/UL (ref 0–0.2)
BASOPHILS NFR BLD: 0 % (ref 0–2)
BILIRUB SERPL-MCNC: 0.3 MG/DL (ref 0–1.2)
BUN SERPL-MCNC: 18 MG/DL (ref 6–23)
CALCIUM SERPL-MCNC: 9.3 MG/DL (ref 8.6–10.2)
CHLORIDE SERPL-SCNC: 103 MMOL/L (ref 98–107)
CO2 SERPL-SCNC: 28 MMOL/L (ref 22–29)
CREAT SERPL-MCNC: 0.8 MG/DL (ref 0.7–1.2)
EOSINOPHIL # BLD: 0.13 K/UL (ref 0.05–0.5)
EOSINOPHILS RELATIVE PERCENT: 2 % (ref 0–6)
ERYTHROCYTE [DISTWIDTH] IN BLOOD BY AUTOMATED COUNT: 12.7 % (ref 11.5–15)
GFR SERPL CREATININE-BSD FRML MDRD: >60 ML/MIN/1.73M2
GLUCOSE SERPL-MCNC: 87 MG/DL (ref 74–99)
HCT VFR BLD AUTO: 43.6 % (ref 37–54)
HGB BLD-MCNC: 14.8 G/DL (ref 12.5–16.5)
IMM GRANULOCYTES # BLD AUTO: <0.03 K/UL (ref 0–0.58)
IMM GRANULOCYTES NFR BLD: 0 % (ref 0–5)
LYMPHOCYTES NFR BLD: 1.65 K/UL (ref 1.5–4)
LYMPHOCYTES RELATIVE PERCENT: 26 % (ref 20–42)
MCH RBC QN AUTO: 31.9 PG (ref 26–35)
MCHC RBC AUTO-ENTMCNC: 33.9 G/DL (ref 32–34.5)
MCV RBC AUTO: 94 FL (ref 80–99.9)
MONOCYTES NFR BLD: 0.58 K/UL (ref 0.1–0.95)
MONOCYTES NFR BLD: 9 % (ref 2–12)
NEUTROPHILS NFR BLD: 63 % (ref 43–80)
NEUTS SEG NFR BLD: 4.06 K/UL (ref 1.8–7.3)
PLATELET # BLD AUTO: 250 K/UL (ref 130–450)
PMV BLD AUTO: 9.6 FL (ref 7–12)
POTASSIUM SERPL-SCNC: 4.4 MMOL/L (ref 3.5–5)
PROT SERPL-MCNC: 7.2 G/DL (ref 6.4–8.3)
RBC # BLD AUTO: 4.64 M/UL (ref 3.8–5.8)
SODIUM SERPL-SCNC: 141 MMOL/L (ref 132–146)
WBC OTHER # BLD: 6.5 K/UL (ref 4.5–11.5)

## 2024-02-16 PROCEDURE — 82232 ASSAY OF BETA-2 PROTEIN: CPT

## 2024-02-16 PROCEDURE — 36415 COLL VENOUS BLD VENIPUNCTURE: CPT

## 2024-02-16 PROCEDURE — 86334 IMMUNOFIX E-PHORESIS SERUM: CPT

## 2024-02-16 PROCEDURE — 82784 ASSAY IGA/IGD/IGG/IGM EACH: CPT

## 2024-02-16 PROCEDURE — 80053 COMPREHEN METABOLIC PANEL: CPT

## 2024-02-16 PROCEDURE — 84155 ASSAY OF PROTEIN SERUM: CPT

## 2024-02-16 PROCEDURE — 83521 IG LIGHT CHAINS FREE EACH: CPT

## 2024-02-16 PROCEDURE — 84165 PROTEIN E-PHORESIS SERUM: CPT

## 2024-02-16 PROCEDURE — 85025 COMPLETE CBC W/AUTO DIFF WBC: CPT

## 2024-02-17 LAB
IGA SERPL-MCNC: 372 MG/DL (ref 70–400)
IGG SERPL-MCNC: 922 MG/DL (ref 700–1600)
IGM SERPL-MCNC: 79 MG/DL (ref 40–230)

## 2024-02-19 LAB
ALBUMIN SERPL-MCNC: 3.7 G/DL (ref 3.5–4.7)
ALPHA1 GLOB SERPL ELPH-MCNC: 0.2 G/DL (ref 0.2–0.4)
ALPHA2 GLOB SERPL ELPH-MCNC: 0.7 G/DL (ref 0.5–1)
B-GLOBULIN SERPL ELPH-MCNC: 1.1 G/DL (ref 0.8–1.3)
GAMMA GLOB SERPL ELPH-MCNC: 1.2 G/DL (ref 0.7–1.6)
INTERPRETATION SERPL IFE-IMP: NORMAL
M PROTEIN SERPL ELPH-MCNC: 0.3 G/DL
PATH REV: NORMAL
PATHOLOGIST: NORMAL
PROT PATTERN SERPL ELPH-IMP: NORMAL
PROT SERPL-MCNC: 6.9 G/DL (ref 6.4–8.3)

## 2024-02-20 LAB
B2 MICROGLOB SERPL IA-MCNC: 1.3 MG/L
FREE KAPPA/LAMBDA RATIO: 1.67 (ref 0.26–1.65)
KAPPA LC FREE SER-MCNC: 16.9 MG/L (ref 3.7–19.4)
LAMBDA LC FREE SERPL-MCNC: 10.1 MG/L (ref 5.7–26.3)

## 2024-02-21 LAB
LAB AP ASR DISCLAIMER: NORMAL
PATH REPORT.ADDENDUM SPEC: NORMAL
PATH REPORT.FINAL DX SPEC: NORMAL
PATH REPORT.GROSS SPEC: NORMAL
PATH REPORT.RELEVANT HX SPEC: NORMAL
PATH REPORT.TOTAL CANCER: NORMAL

## 2024-02-23 ENCOUNTER — OFFICE VISIT (OUTPATIENT)
Dept: ONCOLOGY | Age: 74
End: 2024-02-23

## 2024-02-23 VITALS
OXYGEN SATURATION: 99 % | TEMPERATURE: 97 F | DIASTOLIC BLOOD PRESSURE: 69 MMHG | HEART RATE: 92 BPM | HEIGHT: 77 IN | BODY MASS INDEX: 21.25 KG/M2 | SYSTOLIC BLOOD PRESSURE: 123 MMHG | WEIGHT: 180 LBS

## 2024-02-23 DIAGNOSIS — D47.2 MONOCLONAL GAMMOPATHY: Primary | ICD-10-CM

## 2024-02-23 NOTE — PROGRESS NOTES
that he has quit smoking. He has never used smokeless tobacco.  ETOH:   reports current alcohol use.    Home Medications  Prior to Admission medications    Medication Sig Start Date End Date Taking? Authorizing Provider   Cholecalciferol (VITAMIN D3) 3000 units TABS Take by mouth   Yes Alfonzo Puga MD   atorvastatin (LIPITOR) 20 MG tablet Take 1 tablet by mouth daily 2/21/19  Yes Alfonzo Puga MD   amLODIPine (NORVASC) 10 MG tablet Take 0.5 tablets by mouth daily 2/21/19  Yes Alfonzo Puga MD   Benfotiamine 150 MG CAPS Take by mouth   Yes Alfonzo Puga MD   doxazosin (CARDURA) 4 MG tablet Take 1 tablet by mouth nightly   Yes Alfonzo Puga MD   lisinopril (PRINIVIL;ZESTRIL) 20 MG tablet Take 1 tablet by mouth daily   Yes Alfonzo Puga MD       Allergies  No Known Allergies    Review of Systems:    Constitutional:  No fever chills or rigors.   Eyes: No changes in vision, discharge, or pain  ENT: No Headaches, hearing loss or vertigo. No mouth sores or sore throat.No change in taste or smell.   Cardiovascular: No chest discomfort, dyspnea on exertion, palpitations or loss of consciousness. or phlebitis.   Respiratory: Has no cough or wheezing, Has no sputum production. Has no hemoptysis, Has no pleuritic pain, .   Gastrointestinal: No abdominal pain, appetite loss, blood in stools. No change in bowel habits.No hematemesis   Genitourinary: Patient acknowledges no dysuria, trouble voiding, or hematuria.No nocturia or increased frequency.  Musculoskeletal: No gait disturbance, weakness or joint complaints.  Integumentary: No rash or pruritis.  Neurological: No headache, diplopia, change in muscle strength. + numbness or tingling ONLY w/ riding his snowmobile. No change in gait, balance, coordination, mood, affect, memory, mentation, behavior.   Psychiatric: No anxiety, or depression.   Endocrine: No temperature intolerance. No excessive thirst, fluid intake, or urination.

## 2024-06-06 ENCOUNTER — LAB REQUISITION (OUTPATIENT)
Dept: DERMATOPATHOLOGY | Facility: CLINIC | Age: 74
End: 2024-06-06
Payer: MEDICARE

## 2024-06-06 DIAGNOSIS — L98.9 DISORDER OF THE SKIN AND SUBCUTANEOUS TISSUE, UNSPECIFIED: ICD-10-CM

## 2024-06-06 PROCEDURE — 88321 CONSLTJ&REPRT SLD PREP ELSWR: CPT | Performed by: DERMATOLOGY

## 2024-06-07 LAB
PATH REPORT.FINAL DX SPEC: NORMAL
PATH REPORT.GROSS SPEC: NORMAL
PATH REPORT.RELEVANT HX SPEC: NORMAL
PATH REPORT.TOTAL CANCER: NORMAL

## 2024-08-22 ENCOUNTER — OFFICE VISIT (OUTPATIENT)
Age: 74
End: 2024-08-22
Payer: MEDICARE

## 2024-08-22 ENCOUNTER — HOSPITAL ENCOUNTER (OUTPATIENT)
Dept: INFUSION THERAPY | Age: 74
Discharge: HOME | End: 2024-08-22
Payer: MEDICARE

## 2024-08-22 VITALS
BODY MASS INDEX: 20.84 KG/M2 | WEIGHT: 176.5 LBS | HEART RATE: 55 BPM | DIASTOLIC BLOOD PRESSURE: 74 MMHG | HEIGHT: 77 IN | SYSTOLIC BLOOD PRESSURE: 144 MMHG | TEMPERATURE: 98.2 F

## 2024-08-22 DIAGNOSIS — M54.12 CERVICAL RADICULOPATHY: ICD-10-CM

## 2024-08-22 DIAGNOSIS — D47.2 MONOCLONAL GAMMOPATHY: ICD-10-CM

## 2024-08-22 DIAGNOSIS — R41.3 MEMORY LOSS: ICD-10-CM

## 2024-08-22 DIAGNOSIS — D47.2 NEUROPATHY ASSOCIATED WITH MGUS (HCC): Primary | ICD-10-CM

## 2024-08-22 DIAGNOSIS — G63 NEUROPATHY ASSOCIATED WITH MGUS (HCC): Primary | ICD-10-CM

## 2024-08-22 LAB
ALBUMIN SERPL-MCNC: 4.6 G/DL (ref 3.5–5.2)
ALP SERPL-CCNC: 65 U/L (ref 40–129)
ALT SERPL-CCNC: 12 U/L (ref 0–40)
ANION GAP SERPL CALCULATED.3IONS-SCNC: 7 MMOL/L (ref 7–16)
AST SERPL-CCNC: 28 U/L (ref 0–39)
BASOPHILS # BLD: 0.03 K/UL (ref 0–0.2)
BASOPHILS NFR BLD: 0 % (ref 0–2)
BILIRUB SERPL-MCNC: 0.5 MG/DL (ref 0–1.2)
BUN SERPL-MCNC: 15 MG/DL (ref 6–23)
C-REACTIVE PROTEIN: <3 MG/L (ref 0–5)
CALCIUM SERPL-MCNC: 9.4 MG/DL (ref 8.6–10.2)
CHLORIDE SERPL-SCNC: 102 MMOL/L (ref 98–107)
CO2 SERPL-SCNC: 30 MMOL/L (ref 22–29)
CREAT SERPL-MCNC: 0.7 MG/DL (ref 0.7–1.2)
EOSINOPHIL # BLD: 0.07 K/UL (ref 0.05–0.5)
EOSINOPHILS RELATIVE PERCENT: 1 % (ref 0–6)
ERYTHROCYTE [DISTWIDTH] IN BLOOD BY AUTOMATED COUNT: 12.4 % (ref 11.5–15)
FOLATE: 16.5 NG/ML (ref 4.8–24.2)
GFR, ESTIMATED: >90 ML/MIN/1.73M2
GLUCOSE SERPL-MCNC: 84 MG/DL (ref 74–99)
HCT VFR BLD AUTO: 45.1 % (ref 37–54)
HGB BLD-MCNC: 15.5 G/DL (ref 12.5–16.5)
IGA SERPL-MCNC: 404 MG/DL (ref 70–400)
IGG SERPL-MCNC: 1009 MG/DL (ref 700–1600)
IGM SERPL-MCNC: 86 MG/DL (ref 40–230)
IMM GRANULOCYTES # BLD AUTO: <0.03 K/UL (ref 0–0.58)
IMM GRANULOCYTES NFR BLD: 0 % (ref 0–5)
LYMPHOCYTES NFR BLD: 1.73 K/UL (ref 1.5–4)
LYMPHOCYTES RELATIVE PERCENT: 25 % (ref 20–42)
MCH RBC QN AUTO: 33 PG (ref 26–35)
MCHC RBC AUTO-ENTMCNC: 34.4 G/DL (ref 32–34.5)
MCV RBC AUTO: 96.2 FL (ref 80–99.9)
MONOCYTES NFR BLD: 0.56 K/UL (ref 0.1–0.95)
MONOCYTES NFR BLD: 8 % (ref 2–12)
NEUTROPHILS NFR BLD: 65 % (ref 43–80)
NEUTS SEG NFR BLD: 4.46 K/UL (ref 1.8–7.3)
PLATELET # BLD AUTO: 229 K/UL (ref 130–450)
PMV BLD AUTO: 9.3 FL (ref 7–12)
POTASSIUM SERPL-SCNC: 4 MMOL/L (ref 3.5–5)
PROT SERPL-MCNC: 7.6 G/DL (ref 6.4–8.3)
RBC # BLD AUTO: 4.69 M/UL (ref 3.8–5.8)
RHEUMATOID FACTOR: 17 IU/ML (ref 0–13)
SODIUM SERPL-SCNC: 139 MMOL/L (ref 132–146)
T4 FREE: 1.1 NG/DL (ref 0.9–1.7)
TSH SERPL DL<=0.05 MIU/L-ACNC: 1.01 UIU/ML (ref 0.27–4.2)
VITAMIN B-12: 502 PG/ML (ref 211–946)
WBC OTHER # BLD: 6.9 K/UL (ref 4.5–11.5)

## 2024-08-22 PROCEDURE — 3078F DIAST BP <80 MM HG: CPT | Performed by: PSYCHIATRY & NEUROLOGY

## 2024-08-22 PROCEDURE — 82784 ASSAY IGA/IGD/IGG/IGM EACH: CPT

## 2024-08-22 PROCEDURE — 86334 IMMUNOFIX E-PHORESIS SERUM: CPT

## 2024-08-22 PROCEDURE — G8420 CALC BMI NORM PARAMETERS: HCPCS | Performed by: PSYCHIATRY & NEUROLOGY

## 2024-08-22 PROCEDURE — 80053 COMPREHEN METABOLIC PANEL: CPT

## 2024-08-22 PROCEDURE — 82232 ASSAY OF BETA-2 PROTEIN: CPT

## 2024-08-22 PROCEDURE — 83521 IG LIGHT CHAINS FREE EACH: CPT

## 2024-08-22 PROCEDURE — 4004F PT TOBACCO SCREEN RCVD TLK: CPT | Performed by: PSYCHIATRY & NEUROLOGY

## 2024-08-22 PROCEDURE — 85025 COMPLETE CBC W/AUTO DIFF WBC: CPT

## 2024-08-22 PROCEDURE — 99204 OFFICE O/P NEW MOD 45 MIN: CPT | Performed by: PSYCHIATRY & NEUROLOGY

## 2024-08-22 PROCEDURE — 84155 ASSAY OF PROTEIN SERUM: CPT

## 2024-08-22 PROCEDURE — G8428 CUR MEDS NOT DOCUMENT: HCPCS | Performed by: PSYCHIATRY & NEUROLOGY

## 2024-08-22 PROCEDURE — 84165 PROTEIN E-PHORESIS SERUM: CPT

## 2024-08-22 PROCEDURE — 36415 COLL VENOUS BLD VENIPUNCTURE: CPT

## 2024-08-22 PROCEDURE — 3077F SYST BP >= 140 MM HG: CPT | Performed by: PSYCHIATRY & NEUROLOGY

## 2024-08-22 PROCEDURE — 1123F ACP DISCUSS/DSCN MKR DOCD: CPT | Performed by: PSYCHIATRY & NEUROLOGY

## 2024-08-22 RX ORDER — DONEPEZIL HYDROCHLORIDE 10 MG/1
10 TABLET, FILM COATED ORAL NIGHTLY
Qty: 30 TABLET | Refills: 3
Start: 2024-09-20

## 2024-08-22 RX ORDER — DONEPEZIL HYDROCHLORIDE 5 MG/1
5 TABLET, FILM COATED ORAL NIGHTLY
Qty: 30 TABLET | Refills: 0 | Status: SHIPPED | OUTPATIENT
Start: 2024-08-22 | End: 2024-09-21

## 2024-08-22 RX ORDER — ESCITALOPRAM OXALATE 20 MG/1
20 TABLET ORAL DAILY
COMMUNITY
Start: 2024-06-27

## 2024-08-22 NOTE — PROGRESS NOTES
(NORVASC) 10 MG tablet, Take 0.5 tablets by mouth daily, Disp: , Rfl:     lisinopril (PRINIVIL;ZESTRIL) 20 MG tablet, Take 1 tablet by mouth daily, Disp: , Rfl:     Benfotiamine 150 MG CAPS, Take by mouth (Patient not taking: Reported on 8/22/2024), Disp: , Rfl:     doxazosin (CARDURA) 4 MG tablet, Take 1 tablet by mouth nightly (Patient not taking: Reported on 8/22/2024), Disp: , Rfl:      No Known Allergies     REVIEW OF SYSTEMS    General:  Negativeor Change in Weight, Negativefor Fever   Eyes:   Negative for glaucoma, Negative for Cataracts, Negativefor Glasses  ENT:   Negative for Trouble Swallowing, Negative for Nose Bleeds, Negative for Dentures, Negative for Sinus Problems  Cardiac:  Negative for Chest Pain, Negative for Irregular Heart Beat, Negative for Heart failure, Negative for Angina, Negative for Murmur, Negative for High Blood Pressure, Negative for Pain in the legs w/exercise, Negative for Blood Clots, Negative for Leg Swelling  Respiratory:  Negative for Shortness of Breath, Negative for Cough/Wheezing, Negativefor Asthma, Negative for Other Lung Problems  Heme/Lymph:  Negative for Swollen Nodes/Glands, Negative for Bleeding Problems, Negative for Anemia  Musculoskeletal:   Negative for Joint Replacement, Negative for Broken Bones  GI:  Negative  for Gallbladder, Negative  for Blood in Stool, Negative for Diarrhea, Negative Intestinal Bleeding, Negative for Poor Appetite, Negative for Hiatal Hernia, Negative for Ulcer, Negative for Hemorrhoids, Negative for Nausea/Vomiting, Negative for Constipation  G/U:  NegativeNegative for Pain/Burning, Negative for Urinary Frequency/Urgency, Negative for Blood in Urine, Negative for Slow/Small Stream, Negative for poor Bladder Emptying, Negative for up at night to urinate, Negativesexual Dysfunction  Endo:  Negative for Cold or Heat Intolerance, Negative for Hot Flashes/Flushing, Negative for Abnormal Thirst, Negative for Change in Body Hair  Skin:   Negative

## 2024-08-23 ENCOUNTER — OFFICE VISIT (OUTPATIENT)
Dept: ONCOLOGY | Age: 74
End: 2024-08-23

## 2024-08-23 VITALS
RESPIRATION RATE: 18 BRPM | TEMPERATURE: 98 F | BODY MASS INDEX: 20.59 KG/M2 | WEIGHT: 173.6 LBS | HEART RATE: 55 BPM | DIASTOLIC BLOOD PRESSURE: 76 MMHG | SYSTOLIC BLOOD PRESSURE: 151 MMHG | OXYGEN SATURATION: 100 %

## 2024-08-23 DIAGNOSIS — D47.2 MONOCLONAL GAMMOPATHY: Primary | ICD-10-CM

## 2024-08-23 LAB
ALBUMIN SERPL-MCNC: 3.7 G/DL (ref 3.5–4.7)
ALPHA1 GLOB SERPL ELPH-MCNC: 0.3 G/DL (ref 0.2–0.4)
ALPHA2 GLOB SERPL ELPH-MCNC: 0.8 G/DL (ref 0.5–1)
ANTI RNP AB: NEGATIVE
ANTI-NUCLEAR ANTIBODY (ANA): NEGATIVE
ANTI-SMITH: NEGATIVE
B-GLOBULIN SERPL ELPH-MCNC: 1.2 G/DL (ref 0.8–1.3)
GAMMA GLOB SERPL ELPH-MCNC: 1.3 G/DL (ref 0.7–1.6)
INTERPRETATION SERPL IFE-IMP: NORMAL
JO-1 ANTIBODY: NEGATIVE
M PROTEIN SERPL ELPH-MCNC: 0.4 G/DL
PATH REV: NORMAL
PATHOLOGIST: NORMAL
PROT PATTERN SERPL ELPH-IMP: NORMAL
PROT SERPL-MCNC: 7.3 G/DL (ref 6.4–8.3)
RPR: NONREACTIVE
SCLERODERMA (SCL-70) AB: NEGATIVE
SED RATE, AUTOMATED: 5 MM/HR (ref 0–15)
SJOGREN'S ANTIBODIES (SSA): NEGATIVE
SJOGREN'S ANTIBODIES (SSB): NEGATIVE

## 2024-08-24 LAB
B2 MICROGLOB SERPL IA-MCNC: 1.4 MG/L
FREE KAPPA/LAMBDA RATIO: 1.73 (ref 0.22–1.74)
KAPPA LC FREE SER-MCNC: 27.6 MG/L
LAMBDA LC FREE SERPL-MCNC: 16 MG/L (ref 4.2–27.7)

## 2024-08-25 LAB — BORRELIA BURGDORFERI ABS TOTAL: 0.11 IV

## 2024-08-26 LAB — COPPER: 87.2 UG/DL (ref 70–140)

## 2024-08-27 LAB — VITAMIN B1 WHOLE BLOOD: 345 NMOL/L (ref 70–180)

## 2024-08-29 ENCOUNTER — TELEPHONE (OUTPATIENT)
Age: 74
End: 2024-08-29

## 2024-08-29 NOTE — TELEPHONE ENCOUNTER
----- Message from GIULIANO CHEUNG MA sent at 8/27/2024 10:22 AM EDT -----  You ordered an MRIof brain w contrast, radiology called they can only do it with and without is it ok to do?

## 2024-09-24 RX ORDER — DONEPEZIL HYDROCHLORIDE 10 MG/1
10 TABLET, FILM COATED ORAL NIGHTLY
Qty: 30 TABLET | Refills: 3 | Status: SHIPPED | OUTPATIENT
Start: 2024-09-24

## 2024-12-17 ENCOUNTER — OFFICE VISIT (OUTPATIENT)
Age: 74
End: 2024-12-17
Payer: MEDICARE

## 2024-12-17 VITALS
HEIGHT: 77 IN | WEIGHT: 153 LBS | DIASTOLIC BLOOD PRESSURE: 81 MMHG | BODY MASS INDEX: 18.07 KG/M2 | HEART RATE: 56 BPM | SYSTOLIC BLOOD PRESSURE: 162 MMHG

## 2024-12-17 DIAGNOSIS — G63 NEUROPATHY ASSOCIATED WITH MGUS (HCC): Primary | ICD-10-CM

## 2024-12-17 DIAGNOSIS — D47.2 NEUROPATHY ASSOCIATED WITH MGUS (HCC): Primary | ICD-10-CM

## 2024-12-17 DIAGNOSIS — R41.3 MEMORY LOSS: ICD-10-CM

## 2024-12-17 DIAGNOSIS — M54.12 CERVICAL RADICULOPATHY: ICD-10-CM

## 2024-12-17 PROCEDURE — 1123F ACP DISCUSS/DSCN MKR DOCD: CPT | Performed by: PSYCHIATRY & NEUROLOGY

## 2024-12-17 PROCEDURE — 3077F SYST BP >= 140 MM HG: CPT | Performed by: PSYCHIATRY & NEUROLOGY

## 2024-12-17 PROCEDURE — G8484 FLU IMMUNIZE NO ADMIN: HCPCS | Performed by: PSYCHIATRY & NEUROLOGY

## 2024-12-17 PROCEDURE — 99214 OFFICE O/P EST MOD 30 MIN: CPT | Performed by: PSYCHIATRY & NEUROLOGY

## 2024-12-17 PROCEDURE — G8427 DOCREV CUR MEDS BY ELIG CLIN: HCPCS | Performed by: PSYCHIATRY & NEUROLOGY

## 2024-12-17 PROCEDURE — G8419 CALC BMI OUT NRM PARAM NOF/U: HCPCS | Performed by: PSYCHIATRY & NEUROLOGY

## 2024-12-17 PROCEDURE — 3079F DIAST BP 80-89 MM HG: CPT | Performed by: PSYCHIATRY & NEUROLOGY

## 2024-12-17 PROCEDURE — 1036F TOBACCO NON-USER: CPT | Performed by: PSYCHIATRY & NEUROLOGY

## 2024-12-17 PROCEDURE — 1159F MED LIST DOCD IN RCRD: CPT | Performed by: PSYCHIATRY & NEUROLOGY

## 2024-12-17 RX ORDER — DONEPEZIL HYDROCHLORIDE 10 MG/1
10 TABLET, FILM COATED ORAL NIGHTLY
Qty: 30 TABLET | Refills: 5 | Status: SHIPPED | OUTPATIENT
Start: 2024-12-17

## 2024-12-17 RX ORDER — ESCITALOPRAM OXALATE 20 MG/1
20 TABLET ORAL DAILY
Qty: 14 TABLET | Refills: 0 | Status: SHIPPED | OUTPATIENT
Start: 2024-12-17

## 2024-12-17 RX ORDER — VENLAFAXINE HYDROCHLORIDE 75 MG/1
150 CAPSULE, EXTENDED RELEASE ORAL DAILY
Qty: 30 CAPSULE | Refills: 5 | Status: SHIPPED | OUTPATIENT
Start: 2024-12-31

## 2024-12-17 RX ORDER — VENLAFAXINE HYDROCHLORIDE 37.5 MG/1
37.5 CAPSULE, EXTENDED RELEASE ORAL DAILY
Qty: 14 CAPSULE | Refills: 0 | Status: SHIPPED | OUTPATIENT
Start: 2024-12-17 | End: 2024-12-31

## 2024-12-17 NOTE — PROGRESS NOTES
Swallowing, Negative for Nose Bleeds, Negative for Dentures, Negative for Sinus Problems  Cardiac:  Negative for Chest Pain, Negative for Irregular Heart Beat, Negative for Heart failure, Negative for Angina, Negative for Murmur, Negative for High Blood Pressure, Negative for Pain in the legs w/exercise, Negative for Blood Clots, Negative for Leg Swelling  Respiratory:  Negative for Shortness of Breath, Negative for Cough/Wheezing, Negativefor Asthma, Negative for Other Lung Problems  Heme/Lymph:  Negative for Swollen Nodes/Glands, Negative for Bleeding Problems, Negative for Anemia  Musculoskeletal:   Negative for Joint Replacement, Negative for Broken Bones  GI:  Negative  for Gallbladder, Negative  for Blood in Stool, Negative for Diarrhea, Negative Intestinal Bleeding, Negative for Poor Appetite, Negative for Hiatal Hernia, Negative for Ulcer, Negative for Hemorrhoids, Negative for Nausea/Vomiting, Negative for Constipation  G/U:  NegativeNegative for Pain/Burning, Negative for Urinary Frequency/Urgency, Negative for Blood in Urine, Negative for Slow/Small Stream, Negative for poor Bladder Emptying, Negative for up at night to urinate, Negativesexual Dysfunction  Endo:  Negative for Cold or Heat Intolerance, Negative for Hot Flashes/Flushing, Negative for Abnormal Thirst, Negative for Change in Body Hair  Skin:   Negative for Lumps or Nodules, Negative for Breast Lumps, Negative for Rashes or Sores   Psych:  Negative for Anxiety/Depression       PHYSICAL EXAM  Nursing note and vitals  reviewed.   Constitutional: he is oriented to person, place, and time and well-developed. Well-nourished, and in no distress.  HENT:       Head:  Normocephalic and atraumatic       Nose:  Nose normal        Mouth/Throat: No oropharyngeal         Eyes: Conjunctivae and EOM are normal. Pupils are equal, round, and reactive to light. Right eye exhibits no discharge. Left eye exhibits no discharge. No scleral icterus.      Neck: Normal

## 2024-12-17 NOTE — PATIENT INSTRUCTIONS
Continue aricept 10 mg at night      Overlap lexapro 20 mg daily + effexor xr 37.5 mg daily for 2 weeks  Then d/c lexapro, increase effexor xr to 75 mg daily      Limit using marijuana  Avoid etoh use      Janie Churchill MD

## 2025-02-14 ENCOUNTER — HOSPITAL ENCOUNTER (OUTPATIENT)
Dept: INFUSION THERAPY | Age: 75
Discharge: HOME | End: 2025-02-14
Payer: MEDICARE

## 2025-02-14 DIAGNOSIS — D47.2 MONOCLONAL GAMMOPATHY: ICD-10-CM

## 2025-02-14 LAB
ALBUMIN SERPL-MCNC: 4.2 G/DL (ref 3.5–5.2)
ALP SERPL-CCNC: 74 U/L (ref 40–129)
ALT SERPL-CCNC: 16 U/L (ref 0–40)
ANION GAP SERPL CALCULATED.3IONS-SCNC: 7 MMOL/L (ref 7–16)
AST SERPL-CCNC: 33 U/L (ref 0–39)
BASOPHILS # BLD: 0.02 K/UL (ref 0–0.2)
BASOPHILS NFR BLD: 0 % (ref 0–2)
BILIRUB SERPL-MCNC: 0.4 MG/DL (ref 0–1.2)
BUN SERPL-MCNC: 19 MG/DL (ref 6–23)
CALCIUM SERPL-MCNC: 9.4 MG/DL (ref 8.6–10.2)
CHLORIDE SERPL-SCNC: 101 MMOL/L (ref 98–107)
CO2 SERPL-SCNC: 31 MMOL/L (ref 22–29)
CREAT SERPL-MCNC: 0.7 MG/DL (ref 0.7–1.2)
EOSINOPHIL # BLD: 0.11 K/UL (ref 0.05–0.5)
EOSINOPHILS RELATIVE PERCENT: 2 % (ref 0–6)
ERYTHROCYTE [DISTWIDTH] IN BLOOD BY AUTOMATED COUNT: 13.1 % (ref 11.5–15)
GFR, ESTIMATED: >90 ML/MIN/1.73M2
GLUCOSE SERPL-MCNC: 111 MG/DL (ref 74–99)
HCT VFR BLD AUTO: 45.1 % (ref 37–54)
HGB BLD-MCNC: 15 G/DL (ref 12.5–16.5)
IGA SERPL-MCNC: 422 MG/DL (ref 70–400)
IGG SERPL-MCNC: 1109 MG/DL (ref 700–1600)
IGM SERPL-MCNC: 100 MG/DL (ref 40–230)
IMM GRANULOCYTES # BLD AUTO: <0.03 K/UL (ref 0–0.58)
IMM GRANULOCYTES NFR BLD: 0 % (ref 0–5)
LYMPHOCYTES NFR BLD: 1.84 K/UL (ref 1.5–4)
LYMPHOCYTES RELATIVE PERCENT: 32 % (ref 20–42)
MCH RBC QN AUTO: 31.3 PG (ref 26–35)
MCHC RBC AUTO-ENTMCNC: 33.3 G/DL (ref 32–34.5)
MCV RBC AUTO: 94 FL (ref 80–99.9)
MONOCYTES NFR BLD: 0.47 K/UL (ref 0.1–0.95)
MONOCYTES NFR BLD: 8 % (ref 2–12)
NEUTROPHILS NFR BLD: 58 % (ref 43–80)
NEUTS SEG NFR BLD: 3.38 K/UL (ref 1.8–7.3)
PLATELET # BLD AUTO: 229 K/UL (ref 130–450)
PMV BLD AUTO: 9.4 FL (ref 7–12)
POTASSIUM SERPL-SCNC: 4.3 MMOL/L (ref 3.5–5)
PROT SERPL-MCNC: 7.1 G/DL (ref 6.4–8.3)
RBC # BLD AUTO: 4.8 M/UL (ref 3.8–5.8)
SODIUM SERPL-SCNC: 139 MMOL/L (ref 132–146)
WBC OTHER # BLD: 5.8 K/UL (ref 4.5–11.5)

## 2025-02-14 PROCEDURE — 86334 IMMUNOFIX E-PHORESIS SERUM: CPT

## 2025-02-14 PROCEDURE — 83521 IG LIGHT CHAINS FREE EACH: CPT

## 2025-02-14 PROCEDURE — 80053 COMPREHEN METABOLIC PANEL: CPT

## 2025-02-14 PROCEDURE — 84165 PROTEIN E-PHORESIS SERUM: CPT

## 2025-02-14 PROCEDURE — 85025 COMPLETE CBC W/AUTO DIFF WBC: CPT

## 2025-02-14 PROCEDURE — 84155 ASSAY OF PROTEIN SERUM: CPT

## 2025-02-14 PROCEDURE — 82784 ASSAY IGA/IGD/IGG/IGM EACH: CPT

## 2025-02-14 PROCEDURE — 36415 COLL VENOUS BLD VENIPUNCTURE: CPT

## 2025-02-15 LAB
FREE KAPPA/LAMBDA RATIO: 1.38 (ref 0.22–1.74)
KAPPA LC FREE SER-MCNC: 28.6 MG/L
LAMBDA LC FREE SERPL-MCNC: 20.8 MG/L (ref 4.2–27.7)

## 2025-02-17 LAB
ALBUMIN SERPL-MCNC: 3.6 G/DL (ref 3.5–4.7)
ALPHA1 GLOB SERPL ELPH-MCNC: 0.3 G/DL (ref 0.2–0.4)
ALPHA2 GLOB SERPL ELPH-MCNC: 0.8 G/DL (ref 0.5–1)
B-GLOBULIN SERPL ELPH-MCNC: 1.1 G/DL (ref 0.8–1.3)
GAMMA GLOB SERPL ELPH-MCNC: 1.3 G/DL (ref 0.7–1.6)
INTERPRETATION SERPL IFE-IMP: NORMAL
M PROTEIN SERPL ELPH-MCNC: 0.3 G/DL
PATH REV: NORMAL
PATHOLOGIST: NORMAL
PROT PATTERN SERPL ELPH-IMP: NORMAL
PROT SERPL-MCNC: 7 G/DL (ref 6.4–8.3)

## 2025-02-21 ENCOUNTER — OFFICE VISIT (OUTPATIENT)
Dept: ONCOLOGY | Age: 75
End: 2025-02-21

## 2025-02-21 VITALS
WEIGHT: 180 LBS | BODY MASS INDEX: 21.25 KG/M2 | OXYGEN SATURATION: 100 % | SYSTOLIC BLOOD PRESSURE: 134 MMHG | HEIGHT: 77 IN | DIASTOLIC BLOOD PRESSURE: 71 MMHG | HEART RATE: 61 BPM | TEMPERATURE: 97.7 F

## 2025-02-21 DIAGNOSIS — D47.2 MONOCLONAL GAMMOPATHY: Primary | ICD-10-CM

## 2025-02-21 NOTE — PROGRESS NOTES
[facility]  [address] [place] [address]   Hematology/Oncology  Consult      Patient Name: Herbert Salinas  YOB: 1950  PCP: Lenin Cameron DO   Referring Provider:      Reason for Consultation:   Chief Complaint   Patient presents with    Follow-up     Monoclonal gammopathy            Subjective:  BL hands numbness stable, maybe a bit worse in interim (s/p surgical intervention). Following with derm for SCC's. Feels excellent today and in interim with no new issues. Able to continue normal ADLs and recreational activities . But does want to see surgery again regarding his hand weakness/numbness    History of Present Illness:  This pt is a very pleasant 70 yo male who presents today in referral for evaluation of an abnormal SPEP which showed a M-spike of 0.3 done by his PCP. Review of his CBC's over the last 10 years show a completely normal cell count. He states that this work up began after he noticed numbness, tingling and difficulty using his hands after he went snowmobiling some time ago. He states he gets this sensation every time he snowmobiles, but at no other time including when he is out in the cold or skiing, and not when he is holding other vibrating tools such as his lawnmower or weeding machine. He otherwise has no complaints today including no bone or back pain, no fevers, chills, sweats, or abnormal weight loss. He has not had any HA, dizziness, lightheadedness or other neuropathy.    Diagnostic Data:     Past Medical History:   Diagnosis Date    Hypertension     x25 years    Mild mitral regurgitation     Neuropathy     RBBB 07/07/2017       Patient Active Problem List    Diagnosis Date Noted    RBBB 07/07/2017    Essential hypertension, benign 05/19/2015    Tricuspid regurgitation 05/19/2015    Mild mitral regurgitation         Past Surgical History:   Procedure Laterality Date    APPENDECTOMY         Family History  Family History   Problem Relation Age of

## 2025-04-21 ENCOUNTER — OFFICE VISIT (OUTPATIENT)
Age: 75
End: 2025-04-21
Payer: MEDICARE

## 2025-04-21 VITALS
SYSTOLIC BLOOD PRESSURE: 156 MMHG | DIASTOLIC BLOOD PRESSURE: 80 MMHG | BODY MASS INDEX: 21.21 KG/M2 | HEART RATE: 64 BPM | HEIGHT: 77 IN | WEIGHT: 179.6 LBS

## 2025-04-21 DIAGNOSIS — G63 NEUROPATHY ASSOCIATED WITH MGUS: Primary | ICD-10-CM

## 2025-04-21 DIAGNOSIS — G31.84 AMNESTIC MCI (MILD COGNITIVE IMPAIRMENT WITH MEMORY LOSS): ICD-10-CM

## 2025-04-21 DIAGNOSIS — M54.12 CERVICAL RADICULOPATHY: ICD-10-CM

## 2025-04-21 DIAGNOSIS — D47.2 NEUROPATHY ASSOCIATED WITH MGUS: Primary | ICD-10-CM

## 2025-04-21 PROCEDURE — 3077F SYST BP >= 140 MM HG: CPT | Performed by: PSYCHIATRY & NEUROLOGY

## 2025-04-21 PROCEDURE — 1159F MED LIST DOCD IN RCRD: CPT | Performed by: PSYCHIATRY & NEUROLOGY

## 2025-04-21 PROCEDURE — G8420 CALC BMI NORM PARAMETERS: HCPCS | Performed by: PSYCHIATRY & NEUROLOGY

## 2025-04-21 PROCEDURE — 3079F DIAST BP 80-89 MM HG: CPT | Performed by: PSYCHIATRY & NEUROLOGY

## 2025-04-21 PROCEDURE — 1123F ACP DISCUSS/DSCN MKR DOCD: CPT | Performed by: PSYCHIATRY & NEUROLOGY

## 2025-04-21 PROCEDURE — 99214 OFFICE O/P EST MOD 30 MIN: CPT | Performed by: PSYCHIATRY & NEUROLOGY

## 2025-04-21 PROCEDURE — 1036F TOBACCO NON-USER: CPT | Performed by: PSYCHIATRY & NEUROLOGY

## 2025-04-21 PROCEDURE — G8427 DOCREV CUR MEDS BY ELIG CLIN: HCPCS | Performed by: PSYCHIATRY & NEUROLOGY

## 2025-04-21 RX ORDER — VENLAFAXINE HYDROCHLORIDE 75 MG/1
150 CAPSULE, EXTENDED RELEASE ORAL DAILY
Qty: 90 CAPSULE | Refills: 1 | Status: SHIPPED | OUTPATIENT
Start: 2025-04-21

## 2025-04-21 RX ORDER — DONEPEZIL HYDROCHLORIDE 10 MG/1
10 TABLET, FILM COATED ORAL NIGHTLY
Qty: 90 TABLET | Refills: 1 | Status: SHIPPED | OUTPATIENT
Start: 2025-04-21

## 2025-04-21 NOTE — PROGRESS NOTES
Rfl: 0     No Known Allergies     REVIEW OF SYSTEMS    General:  Negativeor Change in Weight, Negativefor Fever   Eyes:   Negative for glaucoma, Negative for Cataracts, Negativefor Glasses  ENT:   Negative for Trouble Swallowing, Negative for Nose Bleeds, Negative for Dentures, Negative for Sinus Problems  Cardiac:  Negative for Chest Pain, Negative for Irregular Heart Beat, Negative for Heart failure, Negative for Angina, Negative for Murmur, Negative for High Blood Pressure, Negative for Pain in the legs w/exercise, Negative for Blood Clots, Negative for Leg Swelling  Respiratory:  Negative for Shortness of Breath, Negative for Cough/Wheezing, Negativefor Asthma, Negative for Other Lung Problems  Heme/Lymph:  Negative for Swollen Nodes/Glands, Negative for Bleeding Problems, Negative for Anemia  Musculoskeletal:   Negative for Joint Replacement, Negative for Broken Bones  GI:  Negative  for Gallbladder, Negative  for Blood in Stool, Negative for Diarrhea, Negative Intestinal Bleeding, Negative for Poor Appetite, Negative for Hiatal Hernia, Negative for Ulcer, Negative for Hemorrhoids, Negative for Nausea/Vomiting, Negative for Constipation  G/U:  NegativeNegative for Pain/Burning, Negative for Urinary Frequency/Urgency, Negative for Blood in Urine, Negative for Slow/Small Stream, Negative for poor Bladder Emptying, Negative for up at night to urinate, Negativesexual Dysfunction  Endo:  Negative for Cold or Heat Intolerance, Negative for Hot Flashes/Flushing, Negative for Abnormal Thirst, Negative for Change in Body Hair  Skin:   Negative for Lumps or Nodules, Negative for Breast Lumps, Negative for Rashes or Sores   Psych:  Negative for Anxiety/Depression       PHYSICAL EXAM  Nursing note and vitals  reviewed.   Constitutional: he is oriented to person, place, and time and well-developed. Well-nourished, and in no distress.  HENT:       Head:  Normocephalic and atraumatic       Nose:  Nose normal

## 2025-04-28 ENCOUNTER — TELEPHONE (OUTPATIENT)
Dept: ONCOLOGY | Age: 75
End: 2025-04-28

## 2025-04-28 NOTE — TELEPHONE ENCOUNTER
Attempted to call pt about voicemail left on 4/25. Pt did not leave details as to what assistance was needed. No answer at this time. Left message with request for call back.

## 2025-06-14 RX ORDER — VENLAFAXINE HYDROCHLORIDE 75 MG/1
150 CAPSULE, EXTENDED RELEASE ORAL DAILY
Qty: 180 CAPSULE | Refills: 0 | Status: SHIPPED | OUTPATIENT
Start: 2025-06-14

## 2025-06-14 NOTE — TELEPHONE ENCOUNTER
The following medication has been approved and sent to your pharmacy    Requested Prescriptions     Signed Prescriptions Disp Refills    venlafaxine (EFFEXOR XR) 75 MG extended release capsule 180 capsule 0     Sig: Take 2 capsules by mouth daily     Authorizing Provider: GILES MEZA

## 2025-07-22 ENCOUNTER — OFFICE VISIT (OUTPATIENT)
Dept: CARDIOLOGY CLINIC | Age: 75
End: 2025-07-22

## 2025-07-22 VITALS
HEIGHT: 77 IN | HEART RATE: 66 BPM | RESPIRATION RATE: 16 BRPM | DIASTOLIC BLOOD PRESSURE: 78 MMHG | WEIGHT: 178 LBS | SYSTOLIC BLOOD PRESSURE: 132 MMHG | BODY MASS INDEX: 21.02 KG/M2

## 2025-07-22 DIAGNOSIS — Z82.49 FAMILY HISTORY OF CORONARY ARTERY DISEASE: ICD-10-CM

## 2025-07-22 DIAGNOSIS — I34.0 NONRHEUMATIC MITRAL VALVE REGURGITATION: ICD-10-CM

## 2025-07-22 DIAGNOSIS — R01.1 HEART MURMUR: ICD-10-CM

## 2025-07-22 DIAGNOSIS — E78.5 DYSLIPIDEMIA: ICD-10-CM

## 2025-07-22 DIAGNOSIS — I34.0 MILD MITRAL REGURGITATION: Primary | ICD-10-CM

## 2025-07-22 DIAGNOSIS — I10 PRIMARY HYPERTENSION: ICD-10-CM

## 2025-07-22 NOTE — PROGRESS NOTES
OFFICE VISIT     PRIMARY CARE PHYSICIAN:      Lenin Cameron DO         REVIEWED MEDICATIONS:        Current Outpatient Medications:     venlafaxine (EFFEXOR XR) 75 MG extended release capsule, Take 2 capsules by mouth daily, Disp: 180 capsule, Rfl: 0    donepezil (ARICEPT) 10 MG tablet, Take 1 tablet by mouth nightly, Disp: 90 tablet, Rfl: 1    Cholecalciferol (VITAMIN D3) 3000 units TABS, Take by mouth, Disp: , Rfl:     atorvastatin (LIPITOR) 20 MG tablet, Take 1 tablet by mouth daily, Disp: , Rfl:     amLODIPine (NORVASC) 10 MG tablet, Take 0.5 tablets by mouth daily, Disp: , Rfl:     lisinopril (PRINIVIL;ZESTRIL) 20 MG tablet, Take 1 tablet by mouth daily, Disp: , Rfl:       S: REASON FOR VISIT:       Chief Complaint   Patient presents with    Cardiac Valve Problem          History of Present Illness:      History of Present Illness     New referral for VHD  Last I saw him in 10/2019    Hx of HTN, RBBB, MR    No hospitalizations or surgeries recently   Compliant with all medications   Orion any exertional chest pain or short of breath   No palpitations, dizzy or syncope.   Active at home   Try to watch diet          Past Medical History:   Diagnosis Date    Hypertension     x25 years    Memory disorder     Mild mitral regurgitation     Neuropathy     RBBB 07/07/2017   Dyslipidemia         Past Surgical History:   Procedure Laterality Date    APPENDECTOMY          No Known Allergies       Family History   Problem Relation Age of Onset    Heart Attack Mother 69        heart attack    Stroke Father 69        heart attack          Social History     Tobacco Use    Smoking status: Former    Smokeless tobacco: Never   Substance Use Topics    Alcohol use: Yes     Alcohol/week: 6.0 standard drinks of alcohol     Types: 6 Cans of beer per week     Comment: social.          Review of Systems:  Constitutional: negative for fever and chills  HEENT: no acute visual symptoms or dysphagia  Respiratory: negative for cough or

## 2025-07-24 ENCOUNTER — HOSPITAL ENCOUNTER (OUTPATIENT)
Dept: CARDIOLOGY | Age: 75
Discharge: HOME | End: 2025-07-26
Attending: INTERNAL MEDICINE
Payer: MEDICARE

## 2025-07-24 VITALS
HEIGHT: 77 IN | DIASTOLIC BLOOD PRESSURE: 93 MMHG | HEART RATE: 60 BPM | SYSTOLIC BLOOD PRESSURE: 162 MMHG | WEIGHT: 178 LBS | BODY MASS INDEX: 21.02 KG/M2

## 2025-07-24 DIAGNOSIS — I10 PRIMARY HYPERTENSION: ICD-10-CM

## 2025-07-24 DIAGNOSIS — I34.0 MILD MITRAL REGURGITATION: ICD-10-CM

## 2025-07-24 DIAGNOSIS — E78.5 DYSLIPIDEMIA: ICD-10-CM

## 2025-07-24 PROCEDURE — 93017 CV STRESS TEST TRACING ONLY: CPT

## 2025-07-25 ENCOUNTER — TELEPHONE (OUTPATIENT)
Dept: CARDIOLOGY CLINIC | Age: 75
End: 2025-07-25

## 2025-07-25 LAB
ECHO BSA: 2.09 M2
STRESS BASELINE DIAS BP: 93 MMHG
STRESS BASELINE HR: 54 BPM
STRESS BASELINE SYS BP: 162 MMHG
STRESS ESTIMATED WORKLOAD: 10 METS
STRESS O2 SAT PEAK: 97 %
STRESS O2 SAT REST: 99 %
STRESS PEAK DIAS BP: 94 MMHG
STRESS PEAK SYS BP: 239 MMHG
STRESS PERCENT HR ACHIEVED: 86 %
STRESS POST PEAK HR: 123 BPM
STRESS RATE PRESSURE PRODUCT: NORMAL BPM*MMHG
STRESS TARGET HR: 143 BPM

## 2025-07-25 NOTE — TELEPHONE ENCOUNTER
----- Message from Dr. Elaine Casillas MD sent at 7/25/2025  9:33 AM EDT -----  Regarding: Stres test  Tell him that his treadmill stress test Ok.   Echo pending    Called patient gave result

## 2025-08-04 ENCOUNTER — TELEPHONE (OUTPATIENT)
Dept: INFUSION THERAPY | Age: 75
End: 2025-08-04

## 2025-08-15 ENCOUNTER — HOSPITAL ENCOUNTER (OUTPATIENT)
Dept: INFUSION THERAPY | Age: 75
Discharge: HOME | End: 2025-08-15
Payer: MEDICARE

## 2025-08-15 DIAGNOSIS — D47.2 MONOCLONAL GAMMOPATHY: ICD-10-CM

## 2025-08-15 LAB
ALBUMIN SERPL-MCNC: 4.4 G/DL (ref 3.5–5.2)
ALP SERPL-CCNC: 66 U/L (ref 40–129)
ALT SERPL-CCNC: 13 U/L (ref 0–50)
ANION GAP SERPL CALCULATED.3IONS-SCNC: 9 MMOL/L (ref 7–16)
AST SERPL-CCNC: 32 U/L (ref 0–50)
BASOPHILS # BLD: 0.04 K/UL (ref 0–0.2)
BASOPHILS NFR BLD: 1 % (ref 0–2)
BILIRUB SERPL-MCNC: 0.5 MG/DL (ref 0–1.2)
BUN SERPL-MCNC: 14 MG/DL (ref 8–23)
CALCIUM SERPL-MCNC: 9.8 MG/DL (ref 8.8–10.2)
CHLORIDE SERPL-SCNC: 103 MMOL/L (ref 98–107)
CO2 SERPL-SCNC: 29 MMOL/L (ref 22–29)
CREAT SERPL-MCNC: 0.7 MG/DL (ref 0.7–1.2)
EOSINOPHIL # BLD: 0.1 K/UL (ref 0.05–0.5)
EOSINOPHILS RELATIVE PERCENT: 2 % (ref 0–6)
ERYTHROCYTE [DISTWIDTH] IN BLOOD BY AUTOMATED COUNT: 12.7 % (ref 11.5–15)
FREE KAPPA/LAMBDA RATIO: 1.43 (ref 0.22–1.74)
GFR, ESTIMATED: >90 ML/MIN/1.73M2
GLUCOSE SERPL-MCNC: 70 MG/DL (ref 74–99)
HCT VFR BLD AUTO: 43.3 % (ref 37–54)
HGB BLD-MCNC: 14.6 G/DL (ref 12.5–16.5)
IGA SERPL-MCNC: 438 MG/DL (ref 70–400)
IGG SERPL-MCNC: 1073 MG/DL (ref 700–1600)
IGM SERPL-MCNC: 85 MG/DL (ref 40–230)
IMM GRANULOCYTES # BLD AUTO: <0.03 K/UL (ref 0–0.58)
IMM GRANULOCYTES NFR BLD: 0 % (ref 0–5)
KAPPA LC FREE SER-MCNC: 26.1 MG/L
LAMBDA LC FREE SERPL-MCNC: 18.2 MG/L (ref 4.2–27.7)
LYMPHOCYTES NFR BLD: 1.72 K/UL (ref 1.5–4)
LYMPHOCYTES RELATIVE PERCENT: 26 % (ref 20–42)
MCH RBC QN AUTO: 31.9 PG (ref 26–35)
MCHC RBC AUTO-ENTMCNC: 33.7 G/DL (ref 32–34.5)
MCV RBC AUTO: 94.5 FL (ref 80–99.9)
MONOCYTES NFR BLD: 0.64 K/UL (ref 0.1–0.95)
MONOCYTES NFR BLD: 10 % (ref 2–12)
NEUTROPHILS NFR BLD: 62 % (ref 43–80)
NEUTS SEG NFR BLD: 4.13 K/UL (ref 1.8–7.3)
PLATELET # BLD AUTO: 224 K/UL (ref 130–450)
PMV BLD AUTO: 9.1 FL (ref 7–12)
POTASSIUM SERPL-SCNC: 4.5 MMOL/L (ref 3.5–5.1)
PROT SERPL-MCNC: 7.4 G/DL (ref 6.4–8.3)
RBC # BLD AUTO: 4.58 M/UL (ref 3.8–5.8)
SODIUM SERPL-SCNC: 140 MMOL/L (ref 136–145)
WBC OTHER # BLD: 6.7 K/UL (ref 4.5–11.5)

## 2025-08-15 PROCEDURE — 82784 ASSAY IGA/IGD/IGG/IGM EACH: CPT

## 2025-08-15 PROCEDURE — 83521 IG LIGHT CHAINS FREE EACH: CPT

## 2025-08-15 PROCEDURE — 86334 IMMUNOFIX E-PHORESIS SERUM: CPT

## 2025-08-15 PROCEDURE — 80053 COMPREHEN METABOLIC PANEL: CPT

## 2025-08-15 PROCEDURE — 36415 COLL VENOUS BLD VENIPUNCTURE: CPT

## 2025-08-15 PROCEDURE — 85025 COMPLETE CBC W/AUTO DIFF WBC: CPT

## 2025-08-15 PROCEDURE — 84155 ASSAY OF PROTEIN SERUM: CPT

## 2025-08-15 PROCEDURE — 84165 PROTEIN E-PHORESIS SERUM: CPT

## 2025-08-18 LAB
ALBUMIN SERPL-MCNC: 3.7 G/DL (ref 3.5–4.7)
ALPHA1 GLOB SERPL ELPH-MCNC: 0.3 G/DL (ref 0.2–0.4)
ALPHA2 GLOB SERPL ELPH-MCNC: 0.7 G/DL (ref 0.5–1)
B-GLOBULIN SERPL ELPH-MCNC: 1 G/DL (ref 0.8–1.3)
GAMMA GLOB SERPL ELPH-MCNC: 1 G/DL (ref 0.7–1.6)
INTERPRETATION SERPL IFE-IMP: NORMAL
PATH REV: NORMAL
PATHOLOGIST: NORMAL
PROT PATTERN SERPL ELPH-IMP: NORMAL
PROT SERPL-MCNC: 6.8 G/DL (ref 6.4–8.3)

## 2025-08-28 ENCOUNTER — HOSPITAL ENCOUNTER (OUTPATIENT)
Dept: CARDIOLOGY | Age: 75
Discharge: HOME | End: 2025-08-30
Attending: INTERNAL MEDICINE
Payer: MEDICARE

## 2025-08-28 VITALS
HEIGHT: 77 IN | BODY MASS INDEX: 21.25 KG/M2 | SYSTOLIC BLOOD PRESSURE: 132 MMHG | WEIGHT: 180 LBS | DIASTOLIC BLOOD PRESSURE: 78 MMHG

## 2025-08-28 DIAGNOSIS — R01.1 HEART MURMUR: ICD-10-CM

## 2025-08-28 DIAGNOSIS — I10 PRIMARY HYPERTENSION: ICD-10-CM

## 2025-08-28 DIAGNOSIS — I34.0 NONRHEUMATIC MITRAL VALVE REGURGITATION: ICD-10-CM

## 2025-08-28 PROCEDURE — 93306 TTE W/DOPPLER COMPLETE: CPT

## 2025-08-29 LAB
ECHO AO ASC DIAM: 3.3 CM
ECHO AO ASCENDING AORTA INDEX: 1.54 CM/M2
ECHO AV AREA PEAK VELOCITY: 2.5 CM2
ECHO AV AREA VTI: 2.9 CM2
ECHO AV AREA/BSA PEAK VELOCITY: 1.2 CM2/M2
ECHO AV AREA/BSA VTI: 1.4 CM2/M2
ECHO AV CUSP MM: 1.8 CM
ECHO AV MEAN GRADIENT: 3 MMHG
ECHO AV MEAN VELOCITY: 0.9 M/S
ECHO AV PEAK GRADIENT: 9 MMHG
ECHO AV PEAK VELOCITY: 1.5 M/S
ECHO AV VELOCITY RATIO: 0.67
ECHO AV VTI: 25.1 CM
ECHO BSA: 2.11 M2
ECHO EST RA PRESSURE: 3 MMHG
ECHO LA DIAMETER INDEX: 1.12 CM/M2
ECHO LA DIAMETER: 2.4 CM
ECHO LA VOL A-L A2C: 37 ML (ref 18–58)
ECHO LA VOL A-L A4C: 32 ML (ref 18–58)
ECHO LA VOL MOD A2C: 36 ML (ref 18–58)
ECHO LA VOL MOD A4C: 27 ML (ref 18–58)
ECHO LA VOLUME AREA LENGTH: 38 ML
ECHO LA VOLUME INDEX A-L A2C: 17 ML/M2 (ref 16–34)
ECHO LA VOLUME INDEX A-L A4C: 15 ML/M2 (ref 16–34)
ECHO LA VOLUME INDEX AREA LENGTH: 18 ML/M2 (ref 16–34)
ECHO LA VOLUME INDEX MOD A2C: 17 ML/M2 (ref 16–34)
ECHO LA VOLUME INDEX MOD A4C: 13 ML/M2 (ref 16–34)
ECHO LV EF PHYSICIAN: 65 %
ECHO LV FRACTIONAL SHORTENING: 31 % (ref 28–44)
ECHO LV INTERNAL DIMENSION DIASTOLE INDEX: 2.24 CM/M2
ECHO LV INTERNAL DIMENSION DIASTOLIC: 4.8 CM (ref 4.2–5.9)
ECHO LV INTERNAL DIMENSION SYSTOLIC INDEX: 1.54 CM/M2
ECHO LV INTERNAL DIMENSION SYSTOLIC: 3.3 CM
ECHO LV IVSD: 1 CM (ref 0.6–1)
ECHO LV MASS 2D: 170.2 G (ref 88–224)
ECHO LV MASS INDEX 2D: 79.5 G/M2 (ref 49–115)
ECHO LV POSTERIOR WALL DIASTOLIC: 1 CM (ref 0.6–1)
ECHO LV RELATIVE WALL THICKNESS RATIO: 0.42
ECHO LVOT AREA: 3.8 CM2
ECHO LVOT AV VTI INDEX: 0.78
ECHO LVOT DIAM: 2.2 CM
ECHO LVOT MEAN GRADIENT: 2 MMHG
ECHO LVOT PEAK GRADIENT: 4 MMHG
ECHO LVOT PEAK VELOCITY: 1 M/S
ECHO LVOT STROKE VOLUME INDEX: 34.8 ML/M2
ECHO LVOT SV: 74.5 ML
ECHO LVOT VTI: 19.6 CM
ECHO MV A VELOCITY: 0.85 M/S
ECHO MV AREA PHT: 3.2 CM2
ECHO MV AREA VTI: 2.8 CM2
ECHO MV E DECELERATION TIME (DT): 221.8 MS
ECHO MV E VELOCITY: 0.65 M/S
ECHO MV E/A RATIO: 0.76
ECHO MV LVOT VTI INDEX: 1.34
ECHO MV MAX VELOCITY: 1 M/S
ECHO MV MEAN GRADIENT: 1 MMHG
ECHO MV MEAN VELOCITY: 0.5 M/S
ECHO MV PEAK GRADIENT: 4 MMHG
ECHO MV PRESSURE HALF TIME (PHT): 69.8 MS
ECHO MV VTI: 26.3 CM
ECHO PV MAX VELOCITY: 1 M/S
ECHO PV MEAN GRADIENT: 2 MMHG
ECHO PV MEAN VELOCITY: 0.7 M/S
ECHO PV PEAK GRADIENT: 4 MMHG
ECHO PV VTI: 17.5 CM
ECHO RIGHT VENTRICULAR SYSTOLIC PRESSURE (RVSP): 24 MMHG
ECHO RV INTERNAL DIMENSION: 2.7 CM
ECHO TV REGURGITANT MAX VELOCITY: 2.27 M/S
ECHO TV REGURGITANT PEAK GRADIENT: 21 MMHG

## 2025-09-02 ENCOUNTER — TELEPHONE (OUTPATIENT)
Dept: CARDIOLOGY CLINIC | Age: 75
End: 2025-09-02